# Patient Record
Sex: FEMALE | Race: WHITE | NOT HISPANIC OR LATINO | Employment: FULL TIME | URBAN - METROPOLITAN AREA
[De-identification: names, ages, dates, MRNs, and addresses within clinical notes are randomized per-mention and may not be internally consistent; named-entity substitution may affect disease eponyms.]

---

## 2019-09-06 ENCOUNTER — HOSPITAL ENCOUNTER (OUTPATIENT)
Dept: MAMMOGRAPHY | Facility: HOSPITAL | Age: 39
Discharge: HOME | End: 2019-09-06
Attending: NURSE PRACTITIONER

## 2021-08-03 ENCOUNTER — OFFICE VISIT (OUTPATIENT)
Dept: CARDIOLOGY | Facility: CLINIC | Age: 41
End: 2021-08-03

## 2021-08-03 VITALS
BODY MASS INDEX: 36.48 KG/M2 | WEIGHT: 227 LBS | HEART RATE: 72 BPM | SYSTOLIC BLOOD PRESSURE: 155 MMHG | DIASTOLIC BLOOD PRESSURE: 95 MMHG | HEIGHT: 66 IN

## 2021-08-03 DIAGNOSIS — I10 ESSENTIAL HYPERTENSION: ICD-10-CM

## 2021-08-03 DIAGNOSIS — I47.1 PAROXYSMAL SVT (SUPRAVENTRICULAR TACHYCARDIA) (HCC): Primary | ICD-10-CM

## 2021-08-03 PROBLEM — G43.909 MIGRAINES: Status: ACTIVE | Noted: 2021-08-03

## 2021-08-03 PROBLEM — I47.10 PAROXYSMAL SVT (SUPRAVENTRICULAR TACHYCARDIA): Status: ACTIVE | Noted: 2021-08-03

## 2021-08-03 PROCEDURE — 99204 OFFICE O/P NEW MOD 45 MIN: CPT | Performed by: INTERNAL MEDICINE

## 2021-08-03 PROCEDURE — 93000 ELECTROCARDIOGRAM COMPLETE: CPT | Performed by: INTERNAL MEDICINE

## 2021-08-03 RX ORDER — METOPROLOL SUCCINATE 50 MG/1
50 TABLET, EXTENDED RELEASE ORAL 2 TIMES DAILY
COMMUNITY
Start: 2021-07-19 | End: 2021-08-03

## 2021-08-03 RX ORDER — CARVEDILOL 6.25 MG/1
6.25 TABLET ORAL 2 TIMES DAILY
Qty: 180 TABLET | Refills: 3 | Status: SHIPPED | OUTPATIENT
Start: 2021-08-03 | End: 2022-08-24 | Stop reason: SDUPTHER

## 2021-08-03 RX ORDER — ERENUMAB-AOOE 70 MG/ML
INJECTION SUBCUTANEOUS
COMMUNITY
Start: 2021-08-01

## 2021-08-03 NOTE — PROGRESS NOTES
"Chief Complaint  Irregular Heart Beat (daily)      History of Present Illness  Beatriz Larios presents to [facility] CARDIOLOGY  Heart palp in April  And found to have elevated bp started in June on metoprolol   Dizziness while standing in store. Headaches   Patient is a 41-year-old female who developed heart palpitations visit in April.  She went to see her family doctor at that time was noted to have new hypertension as well.  Patient subsequently has been started on metoprolol and had some improvement in her blood pressure control but elevated today.  She also does report some dizziness at times with standing and headaches recently as well.  She has not had any overt syncope no chest pain shortness of breath PND orthopnea    Past Medical History:   Diagnosis Date   • Arrhythmia    • Essential hypertension 8/3/2021   • Hypertension    • Palpitations    • Paroxysmal SVT (supraventricular tachycardia) (CMS/[facility]) 8/3/2021         Current Outpatient Medications:   •  Aimovig 70 MG/ML prefilled syringe, Every 30 (Thirty) Days., Disp: , Rfl:   •  carvedilol (COREG) 6.25 MG tablet, Take 1 tablet by mouth 2 (Two) Times a Day., Disp: 180 tablet, Rfl: 3    Medications Discontinued During This Encounter   Medication Reason   • metoprolol succinate XL (TOPROL-XL) 50 MG 24 hr tablet      Allergies   Allergen Reactions   • Penicillins Hives        Social History     Tobacco Use   • Smoking status: Never Smoker   • Smokeless tobacco: Never Used   Vaping Use   • Vaping Use: Never used   Substance Use Topics   • Alcohol use: Yes     Alcohol/week: 7.0 standard drinks     Types: 7 Cans of beer per week   • Drug use: Never       Family History   Problem Relation Age of Onset   • Heart disease Father    • Coronary artery disease Paternal Grandfather     Dad with pacemaker      Objective     /95   Pulse 72   Ht 167.6 cm (66\")   Wt 103 kg (227 lb)   BMI 36.64 kg/m²       Physical Exam  Constitutional:       " General: He is awake. He is not in acute distress.     Appearance: Normal appearance.   Neck:      Vascular: No carotid bruit, hepatojugular reflux or JVD.   Cardiovascular:      Rate and Rhythm: Normal rate and regular rhythm.      Chest Wall: PMI is not displaced.      Heart sounds: Normal heart sounds, S1 normal and S2 normal. No murmur heard.   No friction rub. No gallop. No S3 or S4 sounds.    Pulmonary:      Effort: Pulmonary effort is normal.      Breath sounds: Normal breath sounds. No wheezing, rhonchi or rales.   Ext.      Right lower leg: No edema.      Left lower leg: No edema.   Skin:     General: Skin is warm and dry.      Coloration: Skin is not cyanotic.      Findings: No petechiae or rash.   Neurological:      Mental Status: He is alert.   Psychiatric:         Behavior: Behavior is cooperative.       Result Review :     No results found for: PROBNP       No results found for: TSH   No results found for: FREET4   No results found for: DDIMERQUANT  No results found for: MG   No results found for: DIGOXIN   No results found for: TROPONINT   No results found for: POCTROP(              ECG 12 Lead    Date/Time: 8/3/2021 2:32 PM  Performed by: Sudhir Johnson MD  Authorized by: Sudhir Johnson MD   Comparison: not compared with previous ECG   Rhythm: sinus rhythm              No results found for this or any previous visit.  Holter 5/21  Holter monitor with frequent PVCs occasional PACs  7 nonsustained SVT episodes            Diagnoses and all orders for this visit:    1. Paroxysmal SVT (supraventricular tachycardia) (CMS/HCC) (Primary)  Assessment & Plan:  Patient with moderate frequency PVCs and paroxysmal SVT seen on Holter monitor most likely the cause for her symptoms.  She has not had much improvement with her current beta-blocker recommended changing to Coreg 6.25 twice daily this also may better help treat her blood pressure.  Recommended a trial of magnesium supplementation 500 mg a day.  Will get  echocardiogram and recommend avoidance of caffeinated products    Orders:  -     Adult Transthoracic Echo Complete W/ Cont if Necessary Per Protocol; Future    2. Essential hypertension  Assessment & Plan:  Mild elevation in office today recommended getting a home blood pressure monitoring keeping log for review for further titration.  Counseled patient on  • low-sodium diet of less than 2 g  • Aerobic activity 30 minutes a day 5 times a week  • Weight loss          Other orders  -     carvedilol (COREG) 6.25 MG tablet; Take 1 tablet by mouth 2 (Two) Times a Day.  Dispense: 180 tablet; Refill: 3  -     Full Pulmonary Function Test Without Bronchodilator  -     ECG 12 Lead          Follow Up     Return in about 6 months (around 2/3/2022) for EKG with F/U.           Patient was given instructions and counseling regarding her condition or for health maintenance advice. Please see specific information pulled into the AVS if appropriate.

## 2021-08-03 NOTE — ASSESSMENT & PLAN NOTE
Patient with moderate frequency PVCs and paroxysmal SVT seen on Holter monitor most likely the cause for her symptoms.  She has not had much improvement with her current beta-blocker recommended changing to Coreg 6.25 twice daily this also may better help treat her blood pressure.  Recommended a trial of magnesium supplementation 500 mg a day.  Will get echocardiogram and recommend avoidance of caffeinated products

## 2021-08-03 NOTE — PATIENT INSTRUCTIONS
Supraventricular Tachycardia, Adult  Supraventricular tachycardia (SVT) is a kind of abnormal heartbeat. It makes your heart beat very fast and then beat at a normal speed.  A normal resting heartbeat is  times a minute. This condition can make your heart beat more than 150 times a minute. Times of having a fast heartbeat (episodes) can be scary, but they are usually not dangerous. In some cases, they may lead to heart failure if:  · They happen many times per day.  · Last longer than a few seconds.  What are the causes?    A normal heartbeat starts when an area called the sinoatrial node sends out an electrical signal. In SVT, other areas of the heart send out signals that get in the way of the signal from the sinoatrial node.  What increases the risk?  You are more likely to develop this condition if you are:  · 12-30 years old.  · A woman.  The following factors may make you more likely to develop this condition:  · Stress.  · Tiredness.  · Smoking.  · Stimulant drugs, such as cocaine and methamphetamine.  · Alcohol.  · Caffeine.  · Pregnancy.  · Feeling worried or nervous (anxiety).  What are the signs or symptoms?  · A pounding heart.  · A feeling that your heart is skipping beats (palpitations).  · Weakness.  · Trouble getting enough air.  · Pain or tightness in your chest.  · Feeling like you are going to pass out (faint).  · Feeling worried or nervous.  · Dizziness.  · Sweating.  · Feeling sick to your stomach (nausea).  · Passing out.  · Tiredness.  Sometimes, there are no symptoms.  How is this treated?  · Vagal nerve stimulation. Ways to do this include:  ? Holding your breath and pushing, as though you are pooping (having a bowel movement).  ? Massaging an area on one side of your neck. Do not try this yourself. Only a doctor should do this. If done the wrong way, it can lead to a stroke.  ? Bending forward with your head between your legs.  ? Coughing while bending forward with your head between  your legs.  ? Closing your eyes and massaging your eyeballs. Ask a doctor how to do this.  · Medicines that prevent attacks.  · Medicine to stop an attack given through an IV tube at the hospital.  · A small electric shock (cardioversion) that stops an attack.  · Radiofrequency ablation. In this procedure, a small, thin tube (catheter) is used to send energy to the area that is causing the rapid heartbeats.  If you do not have symptoms, you may not need treatment.  Follow these instructions at home:  Stress  · Avoid things that make you feel stressed.  · To deal with stress, try:  ? Doing yoga or meditation, or being out in nature.  ? Listening to relaxing music.  ? Doing deep breathing.  ? Taking steps to be healthy, such as getting lots of sleep, exercising, and eating a balanced diet.  ? Talking with a mental health doctor.  Lifestyle    · Try to get at least 7 hours of sleep each night.  · Do not use any products that contain nicotine or tobacco, such as cigarettes, e-cigarettes, and chewing tobacco. If you need help quitting, ask your doctor.  · Be aware of how alcohol affects you.  ? If alcohol gives you a fast heartbeat, do not drink alcohol.  ? If alcohol does not seem to give you a fast heartbeat, limit alcohol use to no more than 1 drink a day for women who are not pregnant, and 2 drinks a day for men. In the [place] one drink is one of these:  § 12 oz of beer (355 mL).  § 5 oz of wine (148 mL).  § 1½ oz of hard liquor (44 mL).  · Be aware of how caffeine affects you.  ? If caffeine gives you a fast heartbeat, do not eat, drink, or use anything with caffeine in it.  ? If caffeine does not seem to give you a fast heartbeat, limit how much caffeine you eat, drink, or use.  · Do not use stimulant drugs. If you need help quitting, ask your doctor.  General instructions  · Stay at a healthy weight.  · Exercise regularly. Ask your doctor about good activities for you. Try one or a mixture of these:  ? 150 minutes  a week of gentle exercise, like walking or yoga.  ? 75 minutes a week of exercise that is very active, like running or swimming.  · Do vagus nerve treatments to slow down your heartbeat as told by your doctor.  · Take over-the-counter and prescription medicines only as told by your doctor.  · Keep all follow-up visits as told by your doctor. This is important.  Contact a doctor if:  · You have a fast heartbeat more often.  · Times of having a fast heartbeat last longer than before.  · Home treatments to slow down your heartbeat do not help.  · You have new symptoms.  Get help right away if:  · You have chest pain.  · Your symptoms get worse.  · You have trouble breathing.  · Your heart beats very fast for more than 20 minutes.  · You pass out.  These symptoms may be an emergency. Do not wait to see if the symptoms will go away. Get medical help right away. Call your local emergency services (911 in the [place] Do not drive yourself to the hospital.  Summary  · SVT is a type of abnormal heart beat.  · This condition can make your heart beat more than 150 times a minute.  · Treatment depends on how often the condition happens and your symptoms.  This information is not intended to replace advice given to you by your health care provider. Make sure you discuss any questions you have with your health care provider.  Document Revised: 11/05/2019 Document Reviewed: 11/05/2019  [employer] Patient Education © 2021 [employer]      Hypertension, Adult  Hypertension is another name for high blood pressure. High blood pressure forces your heart to work harder to pump blood. This can cause problems over time.  There are two numbers in a blood pressure reading. There is a top number (systolic) over a bottom number (diastolic). It is best to have a blood pressure that is below 120/80. Healthy choices can help lower your blood pressure, or you may need medicine to help lower it.  What are the causes?  The cause of this condition is  not known. Some conditions may be related to high blood pressure.  What increases the risk?  · Smoking.  · Having type 2 diabetes mellitus, high cholesterol, or both.  · Not getting enough exercise or physical activity.  · Being overweight.  · Having too much fat, sugar, calories, or salt (sodium) in your diet.  · Drinking too much alcohol.  · Having long-term (chronic) kidney disease.  · Having a family history of high blood pressure.  · Age. Risk increases with age.  · Race. You may be at higher risk if you are .  · Gender. Men are at higher risk than women before age 45. After age 65, women are at higher risk than men.  · Having obstructive sleep apnea.  · Stress.  What are the signs or symptoms?  · High blood pressure may not cause symptoms. Very high blood pressure (hypertensive crisis) may cause:  ? Headache.  ? Feelings of worry or nervousness (anxiety).  ? Shortness of breath.  ? Nosebleed.  ? A feeling of being sick to your stomach (nausea).  ? Throwing up (vomiting).  ? Changes in how you see.  ? Very bad chest pain.  ? Seizures.  How is this treated?  · This condition is treated by making healthy lifestyle changes, such as:  ? Eating healthy foods.  ? Exercising more.  ? Drinking less alcohol.  · Your health care provider may prescribe medicine if lifestyle changes are not enough to get your blood pressure under control, and if:  ? Your top number is above 130.  ? Your bottom number is above 80.  · Your personal target blood pressure may vary.  Follow these instructions at home:  Eating and drinking    · If told, follow the DASH eating plan. To follow this plan:  ? Fill one half of your plate at each meal with fruits and vegetables.  ? Fill one fourth of your plate at each meal with whole grains. Whole grains include whole-wheat pasta, brown rice, and whole-grain bread.  ? Eat or drink low-fat dairy products, such as skim milk or low-fat yogurt.  ? Fill one fourth of your plate at each  meal with low-fat (lean) proteins. Low-fat proteins include fish, chicken without skin, eggs, beans, and tofu.  ? Avoid fatty meat, cured and processed meat, or chicken with skin.  ? Avoid pre-made or processed food.  · Eat less than 1,500 mg of salt each day.  · Do not drink alcohol if:  ? Your doctor tells you not to drink.  ? You are pregnant, may be pregnant, or are planning to become pregnant.  · If you drink alcohol:  ? Limit how much you use to:  § 0-1 drink a day for women.  § 0-2 drinks a day for men.  ? Be aware of how much alcohol is in your drink. In the [place] one drink equals one 12 oz bottle of beer (355 mL), one 5 oz glass of wine (148 mL), or one 1½ oz glass of hard liquor (44 mL).  Lifestyle    · Work with your doctor to stay at a healthy weight or to lose weight. Ask your doctor what the best weight is for you.  · Get at least 30 minutes of exercise most days of the week. This may include walking, swimming, or biking.  · Get at least 30 minutes of exercise that strengthens your muscles (resistance exercise) at least 3 days a week. This may include lifting weights or doing Pilates.  · Do not use any products that contain nicotine or tobacco, such as cigarettes, e-cigarettes, and chewing tobacco. If you need help quitting, ask your doctor.  · Check your blood pressure at home as told by your doctor.  · Keep all follow-up visits as told by your doctor. This is important.  Medicines  · Take over-the-counter and prescription medicines only as told by your doctor. Follow directions carefully.  · Do not skip doses of blood pressure medicine. The medicine does not work as well if you skip doses. Skipping doses also puts you at risk for problems.  · Ask your doctor about side effects or reactions to medicines that you should watch for.  Contact a doctor if you:  · Think you are having a reaction to the medicine you are taking.  · Have headaches that keep coming back (recurring).  · Feel dizzy.  · Have  swelling in your ankles.  · Have trouble with your vision.  Get help right away if you:  · Get a very bad headache.  · Start to feel mixed up (confused).  · Feel weak or numb.  · Feel faint.  · Have very bad pain in your:  ? Chest.  ? Belly (abdomen).  · Throw up more than once.  · Have trouble breathing.  Summary  · Hypertension is another name for high blood pressure.  · High blood pressure forces your heart to work harder to pump blood.  · For most people, a normal blood pressure is less than 120/80.  · Making healthy choices can help lower blood pressure. If your blood pressure does not get lower with healthy choices, you may need to take medicine.  This information is not intended to replace advice given to you by your health care provider. Make sure you discuss any questions you have with your health care provider.  Document Revised: 08/28/2019 Document Reviewed: 08/28/2019  [employer] Patient Education © 2021 [employer]

## 2021-08-03 NOTE — ASSESSMENT & PLAN NOTE
Mild elevation in office today recommended getting a home blood pressure monitoring keeping log for review for further titration.  Counseled patient on  • low-sodium diet of less than 2 g  • Aerobic activity 30 minutes a day 5 times a week  • Weight loss

## 2021-08-27 ENCOUNTER — TELEPHONE (OUTPATIENT)
Dept: CARDIOLOGY | Facility: CLINIC | Age: 41
End: 2021-08-27

## 2021-08-27 NOTE — TELEPHONE ENCOUNTER
----- Message from SKYE Reyes sent at 8/27/2021 11:00 AM EDT -----  Notify pt echo result:  Calculated left ventricular EF = 55%. Estimated right ventricular systolic pressure from tricuspid regurgitation is normal.  Trace mitral valve regurgitation is present.  Keep feb appt

## 2022-02-17 ENCOUNTER — OFFICE VISIT (OUTPATIENT)
Dept: CARDIOLOGY | Facility: CLINIC | Age: 42
End: 2022-02-17

## 2022-02-17 VITALS
BODY MASS INDEX: 34.84 KG/M2 | HEART RATE: 77 BPM | WEIGHT: 222 LBS | SYSTOLIC BLOOD PRESSURE: 152 MMHG | HEIGHT: 67 IN | DIASTOLIC BLOOD PRESSURE: 100 MMHG

## 2022-02-17 DIAGNOSIS — I47.1 PAROXYSMAL SVT (SUPRAVENTRICULAR TACHYCARDIA): ICD-10-CM

## 2022-02-17 DIAGNOSIS — I10 ESSENTIAL HYPERTENSION: Primary | ICD-10-CM

## 2022-02-17 PROCEDURE — 99214 OFFICE O/P EST MOD 30 MIN: CPT | Performed by: INTERNAL MEDICINE

## 2022-02-17 RX ORDER — OMEPRAZOLE 20 MG/1
20 CAPSULE, DELAYED RELEASE ORAL DAILY
COMMUNITY

## 2022-02-17 RX ORDER — HYDROCHLOROTHIAZIDE 12.5 MG/1
12.5 CAPSULE, GELATIN COATED ORAL DAILY
Qty: 90 CAPSULE | Refills: 3 | Status: SHIPPED | OUTPATIENT
Start: 2022-02-17 | End: 2022-08-30 | Stop reason: SDUPTHER

## 2022-02-17 RX ORDER — CETIRIZINE HYDROCHLORIDE 10 MG/1
10 TABLET ORAL DAILY
COMMUNITY

## 2022-02-17 NOTE — ASSESSMENT & PLAN NOTE
Blood pressure not ideally controlled recommend addition of hydrochlorothiazide 12.5 mg once a day and will repeat a BMP in 2 weeks  Counseled patient on  • low-sodium diet of less than 2 g  • Aerobic activity 30 minutes a day 5 times a week  • Weight loss

## 2022-02-17 NOTE — ASSESSMENT & PLAN NOTE
Symptomatically stable continue with Coreg 6.25 mg twice daily dosing encourage avoid caffeine products

## 2022-02-17 NOTE — PROGRESS NOTES
"Chief Complaint  Palpitations (same as usual)    Subjective    Patient has been having persistent mild heart palpitation issues but stable.  She is also noticed problems with persistent blood pressure elevations    Past Medical History:   Diagnosis Date   • Arrhythmia    • Essential hypertension 8/3/2021   • Hypertension    • Palpitations    • Paroxysmal SVT (supraventricular tachycardia) (HCC) 8/3/2021         Current Outpatient Medications:   •  Aimovig 70 MG/ML prefilled syringe, Every 30 (Thirty) Days., Disp: , Rfl:   •  carvedilol (COREG) 6.25 MG tablet, Take 1 tablet by mouth 2 (Two) Times a Day. (Patient taking differently: Take 6.25 mg by mouth Daily.), Disp: 180 tablet, Rfl: 3  •  cetirizine (zyrTEC) 10 MG tablet, Take 10 mg by mouth Daily., Disp: , Rfl:   •  omeprazole (priLOSEC) 20 MG capsule, Take 20 mg by mouth Daily., Disp: , Rfl:   •  hydroCHLOROthiazide (MICROZIDE) 12.5 MG capsule, Take 1 capsule by mouth Daily., Disp: 90 capsule, Rfl: 3    There are no discontinued medications.  Allergies   Allergen Reactions   • Penicillins Hives        Social History     Tobacco Use   • Smoking status: Never Smoker   • Smokeless tobacco: Never Used   Vaping Use   • Vaping Use: Never used   Substance Use Topics   • Alcohol use: Yes     Alcohol/week: 7.0 standard drinks     Types: 7 Cans of beer per week   • Drug use: Never       Family History   Problem Relation Age of Onset   • Heart disease Father    • Coronary artery disease Paternal Grandfather         Objective     /100   Pulse 77   Ht 170.2 cm (67\")   Wt 101 kg (222 lb)   BMI 34.77 kg/m²       Physical Exam    General Appearance:   · no acute distress  · Alert and oriented x3  HENT:   · lips not cyanotic  · Atraumatic  Neck:  · No jvd   · supple  Respiratory:  · no respiratory distress  · normal breath sounds  · no rales  Cardiovascular:  · Regular rate and rhythm  · no S3, no S4   · no murmur  · no rub  Extremities  · No cyanosis  · lower extremity " edema: none    Skin:   · warm, dry  · No rashes      Result Review :     No results found for: PROBNP       No results found for: TSH   No results found for: FREET4   No results found for: DDIMERQUANT  No results found for: MG   No results found for: DIGOXIN   No results found for: TROPONINT          No results found for: POCTROP    Results for orders placed in visit on 08/26/21    Adult Transthoracic Echo Complete W/ Cont if Necessary Per Protocol    Interpretation Summary  · Calculated left ventricular EF = 55% Calculated left ventricular 3D EF = 55% Estimated left ventricular EF was in agreement with the calculated left ventricular EF.  · Estimated right ventricular systolic pressure from tricuspid regurgitation is normal (<35 mmHg).                 Diagnoses and all orders for this visit:    1. Essential hypertension (Primary)  Assessment & Plan:  Blood pressure not ideally controlled recommend addition of hydrochlorothiazide 12.5 mg once a day and will repeat a BMP in 2 weeks  Counseled patient on  • low-sodium diet of less than 2 g  • Aerobic activity 30 minutes a day 5 times a week  • Weight loss        Orders:  -     Basic Metabolic Panel; Future    2. Paroxysmal SVT (supraventricular tachycardia) (HCC)  Assessment & Plan:  Symptomatically stable continue with Coreg 6.25 mg twice daily dosing encourage avoid caffeine products      Other orders  -     hydroCHLOROthiazide (MICROZIDE) 12.5 MG capsule; Take 1 capsule by mouth Daily.  Dispense: 90 capsule; Refill: 3          Follow Up     Return in about 6 months (around 8/17/2022).          Patient was given instructions and counseling regarding her condition or for health maintenance advice. Please see specific information pulled into the AVS if appropriate.

## 2022-03-03 ENCOUNTER — LAB (OUTPATIENT)
Dept: LAB | Facility: HOSPITAL | Age: 42
End: 2022-03-03

## 2022-03-03 DIAGNOSIS — I10 ESSENTIAL HYPERTENSION: ICD-10-CM

## 2022-03-03 LAB
ANION GAP SERPL CALCULATED.3IONS-SCNC: 12 MMOL/L (ref 5–15)
BUN SERPL-MCNC: 13 MG/DL (ref 6–20)
BUN/CREAT SERPL: 22 (ref 7–25)
CALCIUM SPEC-SCNC: 9.5 MG/DL (ref 8.6–10.5)
CHLORIDE SERPL-SCNC: 102 MMOL/L (ref 98–107)
CO2 SERPL-SCNC: 24 MMOL/L (ref 22–29)
CREAT SERPL-MCNC: 0.59 MG/DL (ref 0.57–1)
EGFRCR SERPLBLD CKD-EPI 2021: 114.1 ML/MIN/1.73
GLUCOSE SERPL-MCNC: 101 MG/DL (ref 65–99)
POTASSIUM SERPL-SCNC: 4 MMOL/L (ref 3.5–5.2)
SODIUM SERPL-SCNC: 138 MMOL/L (ref 136–145)

## 2022-03-03 PROCEDURE — 36415 COLL VENOUS BLD VENIPUNCTURE: CPT

## 2022-03-03 PROCEDURE — 80048 BASIC METABOLIC PNL TOTAL CA: CPT

## 2022-03-31 ENCOUNTER — TELEPHONE (OUTPATIENT)
Dept: OBSTETRICS AND GYNECOLOGY | Facility: CLINIC | Age: 42
End: 2022-03-31

## 2022-03-31 NOTE — TELEPHONE ENCOUNTER
1st attempt- received referral from SKYE Haque at [facility]. Patient is being referred for her routine yearly exam. Unable to leave voicemail due to it not being set up. Please schedule first available.

## 2022-04-12 ENCOUNTER — OFFICE VISIT (OUTPATIENT)
Dept: OBSTETRICS AND GYNECOLOGY | Facility: CLINIC | Age: 42
End: 2022-04-12

## 2022-04-12 VITALS
HEART RATE: 103 BPM | DIASTOLIC BLOOD PRESSURE: 101 MMHG | SYSTOLIC BLOOD PRESSURE: 143 MMHG | HEIGHT: 67 IN | WEIGHT: 227 LBS | BODY MASS INDEX: 35.63 KG/M2

## 2022-04-12 DIAGNOSIS — Z01.419 WELL WOMAN EXAM: Primary | ICD-10-CM

## 2022-04-12 DIAGNOSIS — Z12.31 ENCOUNTER FOR SCREENING MAMMOGRAM FOR MALIGNANT NEOPLASM OF BREAST: ICD-10-CM

## 2022-04-12 PROCEDURE — 87624 HPV HI-RISK TYP POOLED RSLT: CPT | Performed by: NURSE PRACTITIONER

## 2022-04-12 PROCEDURE — G0123 SCREEN CERV/VAG THIN LAYER: HCPCS | Performed by: NURSE PRACTITIONER

## 2022-04-12 PROCEDURE — 99396 PREV VISIT EST AGE 40-64: CPT | Performed by: NURSE PRACTITIONER

## 2022-04-12 RX ORDER — VALACYCLOVIR HYDROCHLORIDE 1 G/1
TABLET, FILM COATED ORAL AS NEEDED
COMMUNITY
Start: 2022-03-22

## 2022-04-12 NOTE — PROGRESS NOTES
"  HPI:   42 y.o..Presents for well woman exam. Contraception or HRT: Contraception:  Vasectomy   Menses:   No vaginal bleeding s/p endometrial ablation   Pain:  None  Last pap normal and last    Complaints: Pt has no complaints today.  Patient reports that she is not currently experiencing any symptoms of urinary incontinence.    Past Medical History:   Diagnosis Date   • Arrhythmia    • Essential hypertension 8/3/2021   • Hypertension    • Palpitations    • Paroxysmal SVT (supraventricular tachycardia) (HCC) 8/3/2021      Past Surgical History:   Procedure Laterality Date   • APPENDECTOMY     • ENDOMETRIAL ABLATION        Family History   Problem Relation Age of Onset   • Melanoma Father    • Heart disease Father 50   • Coronary artery disease Paternal Grandfather         PCP: does manage PMHx and preventative labs    PHYSICAL EXAM: Chaperone present BP (!) 143/101   Pulse 103   Ht 170.2 cm (67\")   Wt 103 kg (227 lb)   BMI 35.55 kg/m²   General- NAD, alert and oriented, appropriate  Psych- Normal mood, good memory  Neck- No masses, no thyroid enlargement  Lymphatic- No palpable neck, axillary, or groin nodes  CV- Regular rhythm, no murmurs  Resp- CTA to bases, no wheezes  Abdomen- Soft, non distended, non tender, no masses  Breast left-  Bilaterally symmetrical, no masses, non tender, no nipple discharge  Breast right- Bilaterally symmetrical, no masses, non tender, no nipple discharge  External genitalia- Normal female, no lesions  Urethra/meatus- Normal, no masses, non tender, no prolapse  Bladder- Normal, no masses, non tender, no prolapse  Vagina- Normal, no atrophy, no lesions, no discharge, no prolapse  Cvx- Normal, no lesions, no discharge, No cervical motion tenderness  Uterus- Normal size, shape & consistency.  Non tender, mobile, & no prolapse  Adnexa- No mass, non tender  Anus/Rectum/Perineum- Not performed  Ext- No edema, no cyanosis    Skin- No lesions, no rashes, no acanthosis " nigricans       ASSESSMENT and PLAN:    Diagnoses and all orders for this visit:    1. Well woman exam (Primary)  -     IGP,rfx Aptima HPV All Pth    2. Encounter for screening mammogram for malignant neoplasm of breast  -     Mammo Screening Digital Tomosynthesis Bilateral With CAD; Future        Preventative:   BREAST HEALTH- Monthly self breast exam importance and how to reviewed. MMG and/or MRI (prn) reviewed per society guidelines and her individual history. Screen: Updated today  CERVICAL CANCER Screening- Reviewed current ASCCP guidelines for screening w and wo cotest HPV, age specific.  Screen: Updated today  Follow up PCP/Specialist PMHx and Labs  Myriad: Does not qualify.    She understands the importance of having any ordered tests to be performed in a timely fashion.  The risks of not performing them include, but are not limited to, advanced cancer stages, bone loss from osteoporosis and/or subsequent increase in morbidity and/or mortality.  She is encouraged to review her results online and/or contact or office if she has questions.     Follow Up:  Return for as needed.        Anya Ramirez, APRN  04/12/2022

## 2022-04-21 LAB
CONV .: ABNORMAL
CYTOLOGIST CVX/VAG CYTO: ABNORMAL
CYTOLOGY CVX/VAG DOC CYTO: ABNORMAL
CYTOLOGY CVX/VAG DOC THIN PREP: ABNORMAL
DX ICD CODE: ABNORMAL
DX ICD CODE: ABNORMAL
HIV 1 & 2 AB SER-IMP: ABNORMAL
HPV I/H RISK 4 DNA CVX QL PROBE+SIG AMP: NEGATIVE
OTHER STN SPEC: ABNORMAL
PATHOLOGIST CVX/VAG CYTO: ABNORMAL
RECOM F/U CVX/VAG CYTO: ABNORMAL
STAT OF ADQ CVX/VAG CYTO-IMP: ABNORMAL

## 2022-05-12 ENCOUNTER — HOSPITAL ENCOUNTER (OUTPATIENT)
Dept: MAMMOGRAPHY | Facility: HOSPITAL | Age: 42
Discharge: HOME | End: 2022-05-12
Admitting: NURSE PRACTITIONER

## 2022-05-12 DIAGNOSIS — Z12.31 ENCOUNTER FOR SCREENING MAMMOGRAM FOR MALIGNANT NEOPLASM OF BREAST: ICD-10-CM

## 2022-05-12 PROCEDURE — 77067 SCR MAMMO BI INCL CAD: CPT

## 2022-05-12 PROCEDURE — 77063 BREAST TOMOSYNTHESIS BI: CPT

## 2022-08-24 ENCOUNTER — OFFICE VISIT (OUTPATIENT)
Dept: CARDIOLOGY | Facility: CLINIC | Age: 42
End: 2022-08-24

## 2022-08-24 VITALS
SYSTOLIC BLOOD PRESSURE: 136 MMHG | HEART RATE: 72 BPM | HEIGHT: 67 IN | DIASTOLIC BLOOD PRESSURE: 67 MMHG | BODY MASS INDEX: 35.22 KG/M2 | WEIGHT: 224.4 LBS

## 2022-08-24 DIAGNOSIS — I10 ESSENTIAL HYPERTENSION: Primary | ICD-10-CM

## 2022-08-24 DIAGNOSIS — I47.1 PAROXYSMAL SVT (SUPRAVENTRICULAR TACHYCARDIA): ICD-10-CM

## 2022-08-24 PROCEDURE — 99214 OFFICE O/P EST MOD 30 MIN: CPT | Performed by: INTERNAL MEDICINE

## 2022-08-24 RX ORDER — CARVEDILOL 6.25 MG/1
6.25 TABLET ORAL EVERY 24 HOURS
Qty: 90 TABLET | Refills: 3 | Status: SHIPPED | OUTPATIENT
Start: 2022-08-24

## 2022-08-24 NOTE — PROGRESS NOTES
"Chief Complaint  Hypertension and Paroxysmal SVT (supraventricular tachycardia)     Subjective    Patient is doing very well no symptomatic tachycardic problems blood pressure be maintained    Past Medical History:   Diagnosis Date   • Arrhythmia    • Essential hypertension 8/3/2021   • Hypertension    • Palpitations    • Paroxysmal SVT (supraventricular tachycardia) (HCC) 8/3/2021         Current Outpatient Medications:   •  Aimovig 70 MG/ML prefilled syringe, Every 30 (Thirty) Days., Disp: , Rfl:   •  carvedilol (COREG) 6.25 MG tablet, Take 1 tablet by mouth Daily., Disp: 90 tablet, Rfl: 3  •  cetirizine (zyrTEC) 10 MG tablet, Take 10 mg by mouth Daily., Disp: , Rfl:   •  hydroCHLOROthiazide (MICROZIDE) 12.5 MG capsule, Take 1 capsule by mouth Daily., Disp: 90 capsule, Rfl: 3  •  omeprazole (priLOSEC) 20 MG capsule, Take 20 mg by mouth Daily., Disp: , Rfl:   •  valACYclovir (VALTREX) 1000 MG tablet, As Needed., Disp: , Rfl:     Medications Discontinued During This Encounter   Medication Reason   • carvedilol (COREG) 6.25 MG tablet Reorder     Allergies   Allergen Reactions   • Penicillins Hives        Social History     Tobacco Use   • Smoking status: Never Smoker   • Smokeless tobacco: Never Used   Vaping Use   • Vaping Use: Never used   Substance Use Topics   • Alcohol use: Not Currently     Alcohol/week: 7.0 standard drinks     Types: 7 Cans of beer per week   • Drug use: Never       Family History   Problem Relation Age of Onset   • Melanoma Father    • Heart disease Father 50   • Coronary artery disease Paternal Grandfather         Objective     /67   Pulse 72   Ht 170.2 cm (67\")   Wt 102 kg (224 lb 6.4 oz)   BMI 35.15 kg/m²       Physical Exam    General Appearance:   · no acute distress  · Alert and oriented x3  HENT:   · lips not cyanotic  · Atraumatic  Neck:  · No jvd   · supple  Respiratory:  · no respiratory distress  · normal breath sounds  · no rales  Cardiovascular:  · Regular rate and " rhythm  · no S3, no S4   · no murmur  · no rub  Extremities  · No cyanosis  · lower extremity edema: none    Skin:   · warm, dry  · No rashes      Result Review :     No results found for: PROBNP  CMP    CMP 3/3/22   Glucose 101 (A)   BUN 13   Creatinine 0.59   Sodium 138   Potassium 4.0   Chloride 102   Calcium 9.5   (A) Abnormal value               No results found for: TSH   Lab Results   Component Value Date    FREET4 0.81 03/22/2022      No results found for: DDIMERQUANT  No results found for: MG   No results found for: DIGOXIN   No results found for: TROPONINT          No results found for: POCTROP    Results for orders placed in visit on 08/26/21    Adult Transthoracic Echo Complete W/ Cont if Necessary Per Protocol    Interpretation Summary  · Calculated left ventricular EF = 55% Calculated left ventricular 3D EF = 55% Estimated left ventricular EF was in agreement with the calculated left ventricular EF.  · Estimated right ventricular systolic pressure from tricuspid regurgitation is normal (<35 mmHg).                           Diagnoses and all orders for this visit:    1. Essential hypertension (Primary)  Assessment & Plan:  Continue with HCTZ 12.5 daily and Coreg 6.25 daily  Counseled patient on  • low-sodium diet of less than 2 g  • Aerobic activity 30 minutes a day 5 times a week  • Weight loss          2. Paroxysmal SVT (supraventricular tachycardia) (HCC)  Assessment & Plan:  Patient symptomatically stable continue Coreg 6.25 daily      Other orders  -     carvedilol (COREG) 6.25 MG tablet; Take 1 tablet by mouth Daily.  Dispense: 90 tablet; Refill: 3          Follow Up     Return in about 1 year (around 8/24/2023) for EKG with F/U.          Patient was given instructions and counseling regarding her condition or for health maintenance advice. Please see specific information pulled into the AVS if appropriate.

## 2022-08-30 RX ORDER — HYDROCHLOROTHIAZIDE 12.5 MG/1
12.5 CAPSULE, GELATIN COATED ORAL DAILY
Qty: 90 CAPSULE | Refills: 3 | Status: SHIPPED | OUTPATIENT
Start: 2022-08-30

## 2023-04-06 ENCOUNTER — TELEPHONE (OUTPATIENT)
Dept: CARDIOLOGY | Facility: CLINIC | Age: 43
End: 2023-04-06
Payer: COMMERCIAL

## 2023-04-06 NOTE — TELEPHONE ENCOUNTER
Caller: Harriet Beatriz     Relationship: SELF     Best call back number: [phone]    What is your medical concern? BP HAS BEEN GREAT BUT FOR THE LAST MONTH PT HAS BEEN FEELING DIZZY AND LIGHT HEADED. PASSED OUT ONCE. PT WOULD LIKE TO KNOW IF SHE CAN STOP TAKING BP MEDS.     How long has this issue been going on? PAST MONTH    Is your provider already aware of this issue?NO     Have you been treated for this issue? NO

## 2023-04-12 ENCOUNTER — TRANSCRIBE ORDERS (OUTPATIENT)
Dept: ADMINISTRATIVE | Facility: HOSPITAL | Age: 43
End: 2023-04-12
Payer: COMMERCIAL

## 2023-04-12 DIAGNOSIS — Z12.31 ENCOUNTER FOR SCREENING MAMMOGRAM FOR MALIGNANT NEOPLASM OF BREAST: Primary | ICD-10-CM

## 2023-04-12 NOTE — TELEPHONE ENCOUNTER
SW patient. Patient states since having 70 ln weight loss she started having dizziness and lightheaded. Patient quit taking her HCTZ and coreg. Patient states since quitting she has felt much better and BP running 105-110/70. Advised I would make note and to keep monitoring BP.

## 2023-08-23 ENCOUNTER — OFFICE VISIT (OUTPATIENT)
Dept: CARDIOLOGY | Facility: CLINIC | Age: 43
End: 2023-08-23
Payer: COMMERCIAL

## 2023-08-23 VITALS
BODY MASS INDEX: 22.13 KG/M2 | SYSTOLIC BLOOD PRESSURE: 126 MMHG | HEIGHT: 67 IN | DIASTOLIC BLOOD PRESSURE: 83 MMHG | HEART RATE: 77 BPM | WEIGHT: 141 LBS

## 2023-08-23 DIAGNOSIS — I10 ESSENTIAL HYPERTENSION: ICD-10-CM

## 2023-08-23 DIAGNOSIS — I47.1 PAROXYSMAL SVT (SUPRAVENTRICULAR TACHYCARDIA): Primary | ICD-10-CM

## 2023-08-23 PROCEDURE — 93000 ELECTROCARDIOGRAM COMPLETE: CPT | Performed by: INTERNAL MEDICINE

## 2023-08-23 PROCEDURE — 99213 OFFICE O/P EST LOW 20 MIN: CPT | Performed by: INTERNAL MEDICINE

## 2023-08-23 RX ORDER — ATORVASTATIN CALCIUM 10 MG/1
10 TABLET, FILM COATED ORAL
COMMUNITY

## 2023-08-23 NOTE — PROGRESS NOTES
"Chief Complaint  Follow-up, Hypertension, and Rapid Heart Rate    Subjective    Patient is doing well symptomatically she has had a 83 pound weight loss since her last visit here still some occasional heart palpitations but no significant tachycardic issues.  Past Medical History:   Diagnosis Date    Arrhythmia     Essential hypertension 8/3/2021    Hypertension     Palpitations     Paroxysmal SVT (supraventricular tachycardia) 8/3/2021         Current Outpatient Medications:     Aimovig 70 MG/ML prefilled syringe, Every 30 (Thirty) Days., Disp: , Rfl:     atorvastatin (LIPITOR) 10 MG tablet, Take 1 tablet by mouth every night at bedtime., Disp: , Rfl:     cetirizine (zyrTEC) 10 MG tablet, Take 1 tablet by mouth Daily., Disp: , Rfl:     valACYclovir (VALTREX) 1000 MG tablet, As Needed., Disp: , Rfl:     Medications Discontinued During This Encounter   Medication Reason    carvedilol (COREG) 6.25 MG tablet *Therapy completed    hydroCHLOROthiazide (MICROZIDE) 12.5 MG capsule *Therapy completed    omeprazole (priLOSEC) 20 MG capsule *Therapy completed     Allergies   Allergen Reactions    Penicillins Hives        Social History     Tobacco Use    Smoking status: Never    Smokeless tobacco: Never   Vaping Use    Vaping Use: Never used   Substance Use Topics    Alcohol use: Not Currently     Alcohol/week: 7.0 standard drinks     Types: 7 Cans of beer per week    Drug use: Never       Family History   Problem Relation Age of Onset    Melanoma Father     Heart disease Father 50    Coronary artery disease Paternal Grandfather         Objective     /83   Pulse 77   Ht 170.2 cm (67\")   Wt 64 kg (141 lb)   BMI 22.08 kg/mý       Physical Exam    General Appearance:   no acute distress  Alert and oriented x3  HENT:   lips not cyanotic  Atraumatic  Neck:  No jvd   supple  Respiratory:  no respiratory distress  normal breath sounds  no rales  Cardiovascular:  Regular rate and rhythm  no S3, no S4   no murmur  no " rub  Extremities  No cyanosis  lower extremity edema: none    Skin:   warm, dry  No rashes      Result Review :     No results found for: PROBNP       No results found for: TSH   Lab Results   Component Value Date    FREET4 0.81 03/22/2022      No results found for: DDIMERQUANT  No results found for: MG   No results found for: DIGOXIN   No results found for: TROPONINT          No results found for: POCTROP    Results for orders placed in visit on 08/26/21    Adult Transthoracic Echo Complete W/ Cont if Necessary Per Protocol    Interpretation Summary  ú Calculated left ventricular EF = 55% Calculated left ventricular 3D EF = 55% Estimated left ventricular EF was in agreement with the calculated left ventricular EF.  ú Estimated right ventricular systolic pressure from tricuspid regurgitation is normal (<35 mmHg).       ECG 12 Lead    Date/Time: 8/23/2023 3:41 PM  Performed by: Sudhir Johnson MD  Authorized by: Sudhir Johnson MD   Comparison: compared with previous ECG   Similar to previous ECG  Rhythm: sinus rhythm               Diagnoses and all orders for this visit:    1. Paroxysmal SVT (supraventricular tachycardia) (HCC) (Primary)  Assessment & Plan:  Patient is symptomatically stable she can just follow-up on as-needed basis    Orders:  -     ECG 12 Lead    2. Essential hypertension  Assessment & Plan:  Blood pressure well controlled currently currently no a antihypertensive needed              Follow Up     No follow-ups on file.          Patient was given instructions and counseling regarding her condition or for health maintenance advice. Please see specific information pulled into the AVS if appropriate.

## 2024-01-23 ENCOUNTER — APPOINTMENT (OUTPATIENT)
Dept: CT IMAGING | Facility: HOSPITAL | Age: 44
End: 2024-01-23
Payer: COMMERCIAL

## 2024-01-23 ENCOUNTER — HOSPITAL ENCOUNTER (EMERGENCY)
Facility: HOSPITAL | Age: 44
Discharge: HOME | End: 2024-01-23
Attending: EMERGENCY MEDICINE | Admitting: EMERGENCY MEDICINE
Payer: COMMERCIAL

## 2024-01-23 ENCOUNTER — APPOINTMENT (OUTPATIENT)
Dept: GENERAL RADIOLOGY | Facility: HOSPITAL | Age: 44
End: 2024-01-23
Payer: COMMERCIAL

## 2024-01-23 VITALS
WEIGHT: 140.43 LBS | RESPIRATION RATE: 16 BRPM | BODY MASS INDEX: 22.57 KG/M2 | OXYGEN SATURATION: 99 % | SYSTOLIC BLOOD PRESSURE: 104 MMHG | DIASTOLIC BLOOD PRESSURE: 74 MMHG | TEMPERATURE: 98.2 F | HEART RATE: 81 BPM | HEIGHT: 66 IN

## 2024-01-23 DIAGNOSIS — R56.9 SEIZURE: Primary | ICD-10-CM

## 2024-01-23 LAB
ALBUMIN SERPL-MCNC: 4.5 G/DL (ref 3.5–5.2)
ALBUMIN/GLOB SERPL: 1.6 G/DL
ALP SERPL-CCNC: 51 U/L (ref 39–117)
ALT SERPL W P-5'-P-CCNC: 20 U/L (ref 1–33)
ANION GAP SERPL CALCULATED.3IONS-SCNC: 10.8 MMOL/L (ref 5–15)
AST SERPL-CCNC: 15 U/L (ref 1–32)
BASOPHILS # BLD AUTO: 0.06 10*3/MM3 (ref 0–0.2)
BASOPHILS NFR BLD AUTO: 0.8 % (ref 0–1.5)
BILIRUB SERPL-MCNC: 0.4 MG/DL (ref 0–1.2)
BILIRUB UR QL STRIP: NEGATIVE
BUN SERPL-MCNC: 18 MG/DL (ref 6–20)
BUN/CREAT SERPL: 22.5 (ref 7–25)
CALCIUM SPEC-SCNC: 9.6 MG/DL (ref 8.6–10.5)
CHLORIDE SERPL-SCNC: 106 MMOL/L (ref 98–107)
CK SERPL-CCNC: 64 U/L (ref 20–180)
CLARITY UR: ABNORMAL
CO2 SERPL-SCNC: 23.2 MMOL/L (ref 22–29)
COLOR UR: YELLOW
CREAT SERPL-MCNC: 0.8 MG/DL (ref 0.57–1)
DEPRECATED RDW RBC AUTO: 43.8 FL (ref 37–54)
EGFRCR SERPLBLD CKD-EPI 2021: 92.7 ML/MIN/1.73
EOSINOPHIL # BLD AUTO: 0.08 10*3/MM3 (ref 0–0.4)
EOSINOPHIL NFR BLD AUTO: 1.1 % (ref 0.3–6.2)
ERYTHROCYTE [DISTWIDTH] IN BLOOD BY AUTOMATED COUNT: 12.6 % (ref 12.3–15.4)
GLOBULIN UR ELPH-MCNC: 2.8 GM/DL
GLUCOSE SERPL-MCNC: 89 MG/DL (ref 65–99)
GLUCOSE UR STRIP-MCNC: NEGATIVE MG/DL
HCT VFR BLD AUTO: 40.7 % (ref 34–46.6)
HGB BLD-MCNC: 13.5 G/DL (ref 12–15.9)
HGB UR QL STRIP.AUTO: NEGATIVE
HOLD SPECIMEN: NORMAL
HOLD SPECIMEN: NORMAL
IMM GRANULOCYTES # BLD AUTO: 0.01 10*3/MM3 (ref 0–0.05)
IMM GRANULOCYTES NFR BLD AUTO: 0.1 % (ref 0–0.5)
KETONES UR QL STRIP: ABNORMAL
LEUKOCYTE ESTERASE UR QL STRIP.AUTO: NEGATIVE
LYMPHOCYTES # BLD AUTO: 3.32 10*3/MM3 (ref 0.7–3.1)
LYMPHOCYTES NFR BLD AUTO: 46.8 % (ref 19.6–45.3)
MAGNESIUM SERPL-MCNC: 2.2 MG/DL (ref 1.6–2.6)
MCH RBC QN AUTO: 31.1 PG (ref 26.6–33)
MCHC RBC AUTO-ENTMCNC: 33.2 G/DL (ref 31.5–35.7)
MCV RBC AUTO: 93.8 FL (ref 79–97)
MONOCYTES # BLD AUTO: 0.45 10*3/MM3 (ref 0.1–0.9)
MONOCYTES NFR BLD AUTO: 6.3 % (ref 5–12)
NEUTROPHILS NFR BLD AUTO: 3.17 10*3/MM3 (ref 1.7–7)
NEUTROPHILS NFR BLD AUTO: 44.9 % (ref 42.7–76)
NITRITE UR QL STRIP: NEGATIVE
NRBC BLD AUTO-RTO: 0 /100 WBC (ref 0–0.2)
PH UR STRIP.AUTO: 5.5 [PH] (ref 5–8)
PLATELET # BLD AUTO: 282 10*3/MM3 (ref 140–450)
PMV BLD AUTO: 10.3 FL (ref 6–12)
POTASSIUM SERPL-SCNC: 4.2 MMOL/L (ref 3.5–5.2)
PROT SERPL-MCNC: 7.3 G/DL (ref 6–8.5)
PROT UR QL STRIP: NEGATIVE
QT INTERVAL: 399 MS
QTC INTERVAL: 414 MS
RBC # BLD AUTO: 4.34 10*6/MM3 (ref 3.77–5.28)
SODIUM SERPL-SCNC: 140 MMOL/L (ref 136–145)
SP GR UR STRIP: 1.02 (ref 1–1.03)
TROPONIN T SERPL HS-MCNC: <6 NG/L
UROBILINOGEN UR QL STRIP: ABNORMAL
WBC NRBC COR # BLD AUTO: 7.09 10*3/MM3 (ref 3.4–10.8)
WHOLE BLOOD HOLD COAG: NORMAL
WHOLE BLOOD HOLD SPECIMEN: NORMAL

## 2024-01-23 PROCEDURE — 80053 COMPREHEN METABOLIC PANEL: CPT | Performed by: EMERGENCY MEDICINE

## 2024-01-23 PROCEDURE — 25010000002 DIPHENHYDRAMINE PER 50 MG: Performed by: EMERGENCY MEDICINE

## 2024-01-23 PROCEDURE — 25010000002 LEVETIRACETAM IN NACL 0.75% 1000 MG/100ML SOLUTION: Performed by: EMERGENCY MEDICINE

## 2024-01-23 PROCEDURE — 96374 THER/PROPH/DIAG INJ IV PUSH: CPT

## 2024-01-23 PROCEDURE — 99284 EMERGENCY DEPT VISIT MOD MDM: CPT

## 2024-01-23 PROCEDURE — 93005 ELECTROCARDIOGRAM TRACING: CPT | Performed by: EMERGENCY MEDICINE

## 2024-01-23 PROCEDURE — 25010000002 METOCLOPRAMIDE PER 10 MG: Performed by: EMERGENCY MEDICINE

## 2024-01-23 PROCEDURE — 71045 X-RAY EXAM CHEST 1 VIEW: CPT

## 2024-01-23 PROCEDURE — 70450 CT HEAD/BRAIN W/O DYE: CPT

## 2024-01-23 PROCEDURE — 81003 URINALYSIS AUTO W/O SCOPE: CPT | Performed by: EMERGENCY MEDICINE

## 2024-01-23 PROCEDURE — 82550 ASSAY OF CK (CPK): CPT | Performed by: EMERGENCY MEDICINE

## 2024-01-23 PROCEDURE — 84484 ASSAY OF TROPONIN QUANT: CPT | Performed by: EMERGENCY MEDICINE

## 2024-01-23 PROCEDURE — 83735 ASSAY OF MAGNESIUM: CPT | Performed by: EMERGENCY MEDICINE

## 2024-01-23 PROCEDURE — 25010000002 KETOROLAC TROMETHAMINE PER 15 MG: Performed by: EMERGENCY MEDICINE

## 2024-01-23 PROCEDURE — 85025 COMPLETE CBC W/AUTO DIFF WBC: CPT | Performed by: EMERGENCY MEDICINE

## 2024-01-23 PROCEDURE — 25810000003 SODIUM CHLORIDE 0.9 % SOLUTION: Performed by: EMERGENCY MEDICINE

## 2024-01-23 PROCEDURE — 93005 ELECTROCARDIOGRAM TRACING: CPT

## 2024-01-23 PROCEDURE — 96375 TX/PRO/DX INJ NEW DRUG ADDON: CPT

## 2024-01-23 RX ORDER — LEVETIRACETAM 10 MG/ML
1000 INJECTION INTRAVASCULAR EVERY 12 HOURS SCHEDULED
Status: DISCONTINUED | OUTPATIENT
Start: 2024-01-23 | End: 2024-01-23 | Stop reason: HOSPADM

## 2024-01-23 RX ORDER — METOCLOPRAMIDE HYDROCHLORIDE 5 MG/ML
10 INJECTION INTRAMUSCULAR; INTRAVENOUS ONCE
Status: COMPLETED | OUTPATIENT
Start: 2024-01-23 | End: 2024-01-23

## 2024-01-23 RX ORDER — KETOROLAC TROMETHAMINE 10 MG/1
10 TABLET, FILM COATED ORAL EVERY 6 HOURS PRN
Qty: 15 TABLET | Refills: 0 | Status: SHIPPED | OUTPATIENT
Start: 2024-01-23

## 2024-01-23 RX ORDER — BUPROPION HYDROCHLORIDE 150 MG/1
150 TABLET, EXTENDED RELEASE ORAL
COMMUNITY

## 2024-01-23 RX ORDER — ACETAMINOPHEN 500 MG
1000 TABLET ORAL ONCE
Status: COMPLETED | OUTPATIENT
Start: 2024-01-23 | End: 2024-01-23

## 2024-01-23 RX ORDER — LEVETIRACETAM 500 MG/1
500 TABLET ORAL 2 TIMES DAILY
Qty: 30 TABLET | Refills: 0 | Status: SHIPPED | OUTPATIENT
Start: 2024-01-23

## 2024-01-23 RX ORDER — SODIUM CHLORIDE 0.9 % (FLUSH) 0.9 %
10 SYRINGE (ML) INJECTION AS NEEDED
Status: DISCONTINUED | OUTPATIENT
Start: 2024-01-23 | End: 2024-01-23 | Stop reason: HOSPADM

## 2024-01-23 RX ORDER — DIPHENHYDRAMINE HYDROCHLORIDE 50 MG/ML
12.5 INJECTION INTRAMUSCULAR; INTRAVENOUS ONCE
Status: COMPLETED | OUTPATIENT
Start: 2024-01-23 | End: 2024-01-23

## 2024-01-23 RX ORDER — KETOROLAC TROMETHAMINE 30 MG/ML
30 INJECTION, SOLUTION INTRAMUSCULAR; INTRAVENOUS ONCE
Status: COMPLETED | OUTPATIENT
Start: 2024-01-23 | End: 2024-01-23

## 2024-01-23 RX ADMIN — ACETAMINOPHEN 1000 MG: 500 TABLET ORAL at 16:50

## 2024-01-23 RX ADMIN — SODIUM CHLORIDE 1000 ML: 9 INJECTION, SOLUTION INTRAVENOUS at 16:39

## 2024-01-23 RX ADMIN — METOCLOPRAMIDE HYDROCHLORIDE 10 MG: 5 INJECTION INTRAMUSCULAR; INTRAVENOUS at 16:44

## 2024-01-23 RX ADMIN — DIPHENHYDRAMINE HYDROCHLORIDE 12.5 MG: 50 INJECTION, SOLUTION INTRAMUSCULAR; INTRAVENOUS at 16:42

## 2024-01-23 RX ADMIN — LEVETIRACETAM 1000 MG: 1000 INJECTION, SOLUTION INTRAVENOUS at 15:06

## 2024-01-23 RX ADMIN — KETOROLAC TROMETHAMINE 30 MG: 30 INJECTION, SOLUTION INTRAMUSCULAR; INTRAVENOUS at 16:48

## 2024-01-23 RX ADMIN — SODIUM CHLORIDE 1000 ML: 9 INJECTION, SOLUTION INTRAVENOUS at 15:04

## 2024-01-23 NOTE — ED PROVIDER NOTES
Time: 2:18 PM EST  Date of encounter:  1/23/2024  Independent Historian/Clinical History and Information was obtained by:   Patient and Family    History is limited by: N/A    Chief Complaint: Seizure      History of Present Illness:  Patient is a 43 y.o. year old female who presents to the emergency department for evaluation of seizure-like activity earlier today.  Patient currently reports a headache.  Patient denies numbness and tingling.  Patient has no chest pain or shortness of breath.  Patient denies cough and hemoptysis.  Patient denies dysuria and urinary frequency.  Patient denies fever and chills.    HPI    Patient Care Team  Primary Care Provider: Pooja Haque APRN    Past Medical History:     Allergies   Allergen Reactions    Penicillins Hives     Past Medical History:   Diagnosis Date    Arrhythmia     Essential hypertension 8/3/2021    Hypertension     Palpitations     Paroxysmal SVT (supraventricular tachycardia) 8/3/2021     Past Surgical History:   Procedure Laterality Date    APPENDECTOMY      ENDOMETRIAL ABLATION       Family History   Problem Relation Age of Onset    Melanoma Father     Heart disease Father 50    Coronary artery disease Paternal Grandfather        Home Medications:  Prior to Admission medications    Medication Sig Start Date End Date Taking? Authorizing Provider   atorvastatin (LIPITOR) 10 MG tablet Take 1 tablet by mouth every night at bedtime.   Yes Chalo Garcia MD   buPROPion SR (WELLBUTRIN SR) 150 MG 12 hr tablet Take 1 tablet by mouth.   Yes Chalo Garcia MD   cetirizine (zyrTEC) 10 MG tablet Take 1 tablet by mouth Daily.   Yes Chalo Garcia MD   Erenumab-aooe (AIMOVIG) 70 MG/ML auto-injector Inject 1 mL under the skin into the appropriate area as directed. 11/7/23  Yes Chalo Garcia MD   valACYclovir (VALTREX) 1000 MG tablet As Needed. 3/22/22  Yes Chalo Garcia MD   Aimovig 70 MG/ML prefilled syringe Every 30 (Thirty)  "Days. 8/1/21   Provider, MD Chalo        Social History:   Social History     Tobacco Use    Smoking status: Never    Smokeless tobacco: Never   Vaping Use    Vaping Use: Never used   Substance Use Topics    Alcohol use: Not Currently     Alcohol/week: 7.0 standard drinks of alcohol     Types: 7 Cans of beer per week    Drug use: Never         Review of Systems:  Review of Systems   Constitutional:  Negative for chills and fever.   HENT:  Negative for congestion, rhinorrhea and sore throat.    Eyes:  Negative for pain and visual disturbance.   Respiratory:  Negative for apnea, cough, chest tightness and shortness of breath.    Cardiovascular:  Negative for chest pain and palpitations.   Gastrointestinal:  Negative for abdominal pain, diarrhea, nausea and vomiting.   Genitourinary:  Negative for difficulty urinating and dysuria.   Musculoskeletal:  Negative for joint swelling and myalgias.   Skin:  Negative for color change.   Neurological:  Positive for seizures. Negative for headaches.   Psychiatric/Behavioral: Negative.     All other systems reviewed and are negative.       Physical Exam:  /74 (Patient Position: Lying)   Pulse 81   Temp 98.2 °F (36.8 °C) (Oral)   Resp 16   Ht 167.6 cm (66\")   Wt 63.7 kg (140 lb 6.9 oz)   SpO2 99%   BMI 22.67 kg/m²     Physical Exam  Vitals and nursing note reviewed.   Constitutional:       General: She is not in acute distress.     Appearance: Normal appearance. She is not toxic-appearing.   HENT:      Head: Normocephalic and atraumatic.      Jaw: There is normal jaw occlusion.   Eyes:      General: Lids are normal.      Extraocular Movements: Extraocular movements intact.      Conjunctiva/sclera: Conjunctivae normal.      Pupils: Pupils are equal, round, and reactive to light.   Cardiovascular:      Rate and Rhythm: Normal rate and regular rhythm.      Pulses: Normal pulses.      Heart sounds: Normal heart sounds.   Pulmonary:      Effort: Pulmonary effort is " normal. No respiratory distress.      Breath sounds: Normal breath sounds. No wheezing or rhonchi.   Abdominal:      General: Abdomen is flat.      Palpations: Abdomen is soft.      Tenderness: There is no abdominal tenderness. There is no guarding or rebound.   Musculoskeletal:         General: Normal range of motion.      Cervical back: Normal range of motion and neck supple.      Right lower leg: No edema.      Left lower leg: No edema.   Skin:     General: Skin is warm and dry.   Neurological:      Mental Status: She is alert and oriented to person, place, and time. Mental status is at baseline.   Psychiatric:         Mood and Affect: Mood normal.                  Procedures:  Procedures      Medical Decision Making:      Comorbidities that affect care:    Hypertension    External Notes reviewed:    Previous Clinic Note: Patient was last seen in clinic for paroxysmal SVT.      The following orders were placed and all results were independently analyzed by me:  Orders Placed This Encounter   Procedures    XR Chest 1 View    CT Head Without Contrast    [place] Draw    Comprehensive Metabolic Panel    Single High Sensitivity Troponin T    Magnesium    Urinalysis With Microscopic If Indicated (No Culture) - Urine, Clean Catch    CBC Auto Differential    CK    NPO Diet NPO Type: Strict NPO    Undress & Gown    Continuous Pulse Oximetry    Vital Signs    Orthostatic Blood Pressure    Oxygen Therapy- Nasal Cannula; Titrate 1-6 LPM Per SpO2; 90 - 95%    POC Glucose Once    ECG 12 Lead ED Triage Standing Order; Weak / Dizzy / AMS    Insert Peripheral IV    Fall Precautions    CBC & Differential    Green Top (Gel)    Lavender Top    Gold Top - SST    Light Blue Top       Medications Given in the Emergency Department:  Medications   sodium chloride 0.9 % flush 10 mL (has no administration in time range)   levETIRAcetam in NaCl 0.75% (KEPPRA) IVPB 1,000 mg (0 mg Intravenous Stopped 1/23/24 1526)   sodium chloride 0.9 %  bolus 1,000 mL (0 mL Intravenous Stopped 1/23/24 1638)   ketorolac (TORADOL) injection 30 mg (30 mg Intravenous Given 1/23/24 1648)   acetaminophen (TYLENOL) tablet 1,000 mg (1,000 mg Oral Given 1/23/24 1650)   sodium chloride 0.9 % bolus 1,000 mL (0 mL Intravenous Stopped 1/23/24 1736)   metoclopramide (REGLAN) injection 10 mg (10 mg Intravenous Given 1/23/24 1644)   diphenhydrAMINE (BENADRYL) injection 12.5 mg (12.5 mg Intravenous Given 1/23/24 1642)        ED Course:         Labs:    Lab Results (last 24 hours)       Procedure Component Value Units Date/Time    CBC & Differential [[id number]]  (Abnormal) Collected: 01/23/24 1359    Specimen: Blood Updated: 01/23/24 1415    Narrative:      The following orders were created for panel order CBC & Differential.  Procedure                               Abnormality         Status                     ---------                               -----------         ------                     CBC Auto Differential[[id number]]        Abnormal            Final result                 Please view results for these tests on the individual orders.    Comprehensive Metabolic Panel [[id number]] Collected: 01/23/24 1359    Specimen: Blood Updated: 01/23/24 1440     Glucose 89 mg/dL      BUN 18 mg/dL      Creatinine 0.80 mg/dL      Sodium 140 mmol/L      Potassium 4.2 mmol/L      Chloride 106 mmol/L      CO2 23.2 mmol/L      Calcium 9.6 mg/dL      Total Protein 7.3 g/dL      Albumin 4.5 g/dL      ALT (SGPT) 20 U/L      AST (SGOT) 15 U/L      Alkaline Phosphatase 51 U/L      Total Bilirubin 0.4 mg/dL      Globulin 2.8 gm/dL      A/G Ratio 1.6 g/dL      BUN/Creatinine Ratio 22.5     Anion Gap 10.8 mmol/L      eGFR 92.7 mL/min/1.73     Narrative:      GFR Normal >60  Chronic Kidney Disease <60  Kidney Failure <15      Single High Sensitivity Troponin T [[id number]]  (Normal) Collected: 01/23/24 1359    Specimen: Blood Updated: 01/23/24 1440     HS Troponin T <6 ng/L     Narrative:      High  Sensitive Troponin T Reference Range:  <14.0 ng/L- Negative Female for AMI  <22.0 ng/L- Negative Male for AMI  >=14 - Abnormal Female indicating possible myocardial injury.  >=22 - Abnormal Male indicating possible myocardial injury.   Clinicians would have to utilize clinical acumen, EKG, Troponin, and serial changes to determine if it is an Acute Myocardial Infarction or myocardial injury due to an underlying chronic condition.         Magnesium [[id number]]  (Normal) Collected: 01/23/24 1359    Specimen: Blood Updated: 01/23/24 1440     Magnesium 2.2 mg/dL     CBC Auto Differential [[id number]]  (Abnormal) Collected: 01/23/24 1359    Specimen: Blood Updated: 01/23/24 1415     WBC 7.09 10*3/mm3      RBC 4.34 10*6/mm3      Hemoglobin 13.5 g/dL      Hematocrit 40.7 %      MCV 93.8 fL      MCH 31.1 pg      MCHC 33.2 g/dL      RDW 12.6 %      RDW-SD 43.8 fl      MPV 10.3 fL      Platelets 282 10*3/mm3      Neutrophil % 44.9 %      Lymphocyte % 46.8 %      Monocyte % 6.3 %      Eosinophil % 1.1 %      Basophil % 0.8 %      Immature Grans % 0.1 %      Neutrophils, Absolute 3.17 10*3/mm3      Lymphocytes, Absolute 3.32 10*3/mm3      Monocytes, Absolute 0.45 10*3/mm3      Eosinophils, Absolute 0.08 10*3/mm3      Basophils, Absolute 0.06 10*3/mm3      Immature Grans, Absolute 0.01 10*3/mm3      nRBC 0.0 /100 WBC     CK [[id number]]  (Normal) Collected: 01/23/24 1359    Specimen: Blood Updated: 01/23/24 1451     Creatine Kinase 64 U/L     Urinalysis With Microscopic If Indicated (No Culture) - Urine, Clean Catch [[id number]]  (Abnormal) Collected: 01/23/24 1458    Specimen: Urine, Clean Catch Updated: 01/23/24 1510     Color, UA Yellow     Appearance, UA Cloudy     pH, UA 5.5     Specific Gravity, UA 1.024     Glucose, UA Negative     Ketones, UA Trace     Bilirubin, UA Negative     Blood, UA Negative     Protein, UA Negative     Leuk Esterase, UA Negative     Nitrite, UA Negative     Urobilinogen, UA 0.2 E.U./dL    Narrative:       Urine microscopic not indicated.             Imaging:    CT Head Without Contrast    Result Date: 1/23/2024  PROCEDURE: CT HEAD WO CONTRAST  COMPARISON:  None INDICATIONS: headache  PROTOCOL:   Standard imaging protocol performed    RADIATION:   DLP: 1017.2 mGy*cm   MA and/or KV was adjusted to minimize radiation dose.     TECHNIQUE: After obtaining the patient's consent, CT images were obtained without non-ionic intravenous contrast material.  FINDINGS:  No evidence of acute intracranial hemorrhage, mass, midline shift, loss of gray-white matter differentiation, or extra-axial fluid collection.  Normal ventricular volume.  Basal cisterns are patent.  No evidence of fracture or suspicious osseous lesion.  The visualized soft tissues are unremarkable.  The paranasal sinuses and mastoid air cells are well aerated.       No acute intracranial abnormality.     ROSARIO SWAN MD       Electronically Signed and Approved By: ROSARIO SWAN MD on 1/23/2024 at 14:48             XR Chest 1 View    Result Date: 1/23/2024  PROCEDURE: XR CHEST 1 VW  COMPARISON: None  INDICATIONS: thinks she has series of seizures last night  FINDINGS: Clear lungs.  Normal heart size.  No pleural effusion, pneumothorax, or acute osseous abnormality.        No acute chest finding.       CHEPE GRANDE MD       Electronically Signed and Approved By: CHEPE GRANDE MD on 1/23/2024 at 12:29                Differential Diagnosis and Discussion:    Seizure: Differential diagnosis includes but is not limited to meningitis, hypoglycemia, electrolyte abnormalities, intracranial hemorrhage, toxin induced, and pseudoseizure.    All labs were reviewed and interpreted by me.  CT scan radiology impression was interpreted by me.    MDM     Amount and/or Complexity of Data Reviewed  Clinical lab tests: reviewed  Tests in the radiology section of CPT®: reviewed  Tests in the medicine section of CPT®: reviewed       The patient is resting comfortably and feels better,  is alert, talkative and in no distress. The repeat examination is unremarkable and benign.  The patient´s CBC that was reviewed and interpreted by me shows no abnormalities of critical concern. Of note, there is no anemia requiring a blood transfusion and the platelet count is acceptable.  The patient´s CMP that was reviewed and interpretted by me shows no abnormalities of critical concern. Of note, the patient´s sodium and potassium are acceptable. The patient´s liver enzymes are unremarkable. The patient´s renal function (creatinine) is preserved. The patient has a normal anion gap.  CT head is negative for acute intracranial abnormalities.  Patient was given 1 L normal saline bolus and Keppra in the emergency department.  The patient is neurologically intact, has a normal mental status and this ambulatory in the ED. The history, exam, diagnostic testing in the patient's current condition do not suggest meningitis, stroke, sepsis, subarachnoid hemorrhage, intracranial bleeding, encephalitis or other significant pathology that would warrant further testing, continued ED treatment, admission, neurological consultation, or other specialist evaluation at this point. The vital signs have been stable. The patient's condition is stable and appropriate for discharge. The patient will pursue further outpatient evaluation with the primary care physician or other designated or consulting position as indicated in the discharge instructions.          Patient Care Considerations:    MRI: I considered ordering an MRI however patient is at baseline and has a normal neurological exam.      Consultants/Shared Management Plan:    None    Social Determinants of Health:    Patient is independent, reliable, and has access to care.       Disposition and Care Coordination:    Discharged: I considered escalation of care by admitting this patient to the hospital, however the patient has improved and is suitable and  stable for  discharge.    I have explained the patient´s condition, diagnoses and treatment plan based on the information available to me at this time. I have answered questions and addressed any concerns. The patient has a good  understanding of the patient´s diagnosis, condition, and treatment plan as can be expected at this point. The vital signs have been stable. The patient´s condition is stable and appropriate for discharge from the emergency department.      The patient will pursue further outpatient evaluation with the primary care physician or other designated or consulting physician as outlined in the discharge instructions. They are agreeable to this plan of care and follow-up instructions have been explained in detail. The patient has received these instructions in written format and have expressed an understanding of the discharge instructions. The patient is aware that any significant change in condition or worsening of symptoms should prompt an immediate return to this or the closest emergency department or call to 911.  I have explained discharge medications and the need for follow up with the patient/caretakers. This was also printed in the discharge instructions. Patient was discharged with the following medications and follow up:      Medication List        New Prescriptions      ketorolac 10 MG tablet  Commonly known as: TORADOL  Take 1 tablet by mouth Every 6 (Six) Hours As Needed for Moderate Pain.     levETIRAcetam 500 MG tablet  Commonly known as: KEPPRA  Take 1 tablet by mouth 2 (Two) Times a Day.               Where to Get Your Medications        These medications [place] [facility] [place] [address] - [phone] [facility] [phone] FX  [address], [place] [address]      Phone: [phone]   ketorolac 10 MG tablet  levETIRAcetam 500 MG tablet      OropillBruno kauffman MD  [address]  [place]  [address]  [phone]    In 2 days         Final diagnoses:   Seizure        ED Disposition       ED Disposition   Discharge    Condition   Stable    Comment   --               This medical record created using voice recognition software.             Harpreet Dove MD  01/23/24 6810

## 2024-01-24 ENCOUNTER — TELEPHONE (OUTPATIENT)
Dept: NEUROLOGY | Facility: CLINIC | Age: 44
End: 2024-01-24
Payer: COMMERCIAL

## 2024-01-24 NOTE — TELEPHONE ENCOUNTER
Caller: Beatriz Larios    Relationship to patient: Self    Best call back number: [phone]     New or established patient?  [x] New  [] Established    Date of discharge:1/23/24    Facility discharged from:  [facility]    Diagnosis/Symptoms: SZ    Length of stay (If applicable): 1 DAY    Specialty Only: Did you see a [facility] provider?    [] Yes  [x] No  If so, who? N/A     PATIENT IS REQUSTING 1/2 DAY HOSP F/U W/ [facility] FOR DX:SZ.    PLEASE REVIEW & ADVISE-THANK YOU

## 2024-01-26 NOTE — TELEPHONE ENCOUNTER
Called patient with appt date and time, patient cancelled appt stating she has an earlier appt scheduled at [facility]

## 2024-01-30 LAB
QT INTERVAL: 399 MS
QTC INTERVAL: 414 MS

## 2024-07-25 ENCOUNTER — TELEPHONE (OUTPATIENT)
Dept: ORTHOPEDIC SURGERY | Facility: CLINIC | Age: 44
End: 2024-07-25

## 2024-07-26 ENCOUNTER — OFFICE VISIT (OUTPATIENT)
Dept: ORTHOPEDIC SURGERY | Facility: CLINIC | Age: 44
End: 2024-07-26
Payer: OTHER MISCELLANEOUS

## 2024-07-26 VITALS
BODY MASS INDEX: 23.3 KG/M2 | WEIGHT: 145 LBS | SYSTOLIC BLOOD PRESSURE: 133 MMHG | OXYGEN SATURATION: 98 % | HEART RATE: 90 BPM | HEIGHT: 66 IN | DIASTOLIC BLOOD PRESSURE: 89 MMHG

## 2024-07-26 DIAGNOSIS — S82.54XA CLOSED NONDISPLACED FRACTURE OF MEDIAL MALLEOLUS OF RIGHT TIBIA, INITIAL ENCOUNTER: Primary | ICD-10-CM

## 2024-07-26 RX ORDER — TRAMADOL HYDROCHLORIDE 50 MG/1
TABLET ORAL
COMMUNITY
Start: 2024-07-23

## 2024-07-26 NOTE — PROGRESS NOTES
"Chief Complaint  Initial Evaluation of the Right Ankle     Subjective      Beatriz Larios presents to [facility] for initial evaluation of the right ankle.  She was  working out and she jumped up and when she came down she landed on the side of her ankle She went to  on 7/22/24. She was at a training for work.  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     Allergies   Allergen Reactions    Penicillins Hives        Social History     Socioeconomic History    Marital status:    Tobacco Use    Smoking status: Never    Smokeless tobacco: Never   Vaping Use    Vaping status: Never Used   Substance and Sexual Activity    Alcohol use: Not Currently     Alcohol/week: 7.0 standard drinks of alcohol     Types: 7 Cans of beer per week    Drug use: Never    Sexual activity: Defer     Birth control/protection: Vasectomy        I reviewed the patient's chief complaint, history of present illness, review of systems, past medical history, surgical history, family history, social history, medications, and allergy list.     Review of Systems     Constitutional: Denies fevers, chills, weight loss  Cardiovascular: Denies chest pain, shortness of breath  Skin: Denies rashes, acute skin changes  Neurologic: Denies headache, loss of consciousness  MSK: Right ankle pain.       Vital Signs:   /89   Pulse 90   Ht 167.6 cm (65.98\")   Wt 65.8 kg (145 lb)   SpO2 98%   BMI 23.41 kg/m²          Physical Exam  General: Alert. No acute distress    Ortho Exam        RIGHT ANKLE She is in a splint. Positive EHL, FHL, GS and TA. Sensation intact to all 5 nerves of the foot. Positive pulses.   Stable to stress. Tender to the fracture site. . Intact flexion and extension of toes.  Mild swelling. Achilles intact. Tender to the fracture site. Mild swelling.       Orthopedic Injury Treatment    Date/Time: 7/26/2024 1:43 PM    Performed by: Viry Montemayor  Authorized by: Albert Nettles MD  Injury location: " ankle  Location details: right ankle  Pre-procedure neurovascular assessment: neurovascularly intact    Anesthesia:  Local anesthesia used: no    Sedation:  Patient sedated: no    Immobilization: cast  Splint type: short leg  Supplies used: cotton padding  Post-procedure neurovascular assessment: post-procedure neurovascularly intact  Patient tolerance: patient tolerated the procedure well with no immediate complications  Comments: Closed treatment was obtained and fiberglass cast was applied.  The patient tolerated the procedure without any complications. Fiberglass cast applied by Viry Montemayor CMA.          Imaging Results (Most Recent)       Procedure Component Value Units Date/Time    XR Ankle 3+ View Right [[id number]] Resulted: 07/26/24 1328     Updated: 07/26/24 1329             Result Review :     X-Ray Report:  Right ankle(s) X-Ray  Indication: Evaluation of the right ankle  AP/Lateral view(s)  Findings: Fracture through the base of the medial malleolus with routine alignment.   Prior studies available for comparison: no       XR Ankle 3+ View Right    Result Date: 7/22/2024  Narrative: XR ANKLE 3+ VW RIGHT Date of Exam: 7/22/2024 4:05 PM EDT Indication: injury/pain/swelling Comparison: None available. Findings: Sagittally oriented fracture through the base of the medial malleolus. The lateral malleolus is intact. Spurring at the insertion of the Achilles tendon. Calcaneal spur. The tarsal and visualized metatarsal bones are intact.     Impression: Sagittally oriented fracture through the base of the medial malleolus. Electronically Signed: Winston Pascal MD  7/22/2024 4:32 PM EDT  Workstation ID: [id number]            Assessment and Plan     Diagnoses and all orders for this visit:    1. Closed nondisplaced fracture of medial malleolus of right tibia, initial encounter (Primary)  -     XR Ankle 3+ View Right        Discussed the treatment plan with the patient. I reviewed the X-rays that were obtained today  with the patient.     Short leg cast.  Cast care was educated on.  Elevate above heart to reduce swelling.     Work note given.     Will obtain X-Rays at next visit over the cast    Call or return if worsening symptoms.    Follow Up     2-3 weeks with X ray over the cast.  Will be in a cast for 4 weeks.       Patient was given instructions and counseling regarding her condition or for health maintenance advice. Please see specific information pulled into the AVS if appropriate.     Scribed for Albert Nettles MD by Diane Carty MA.  07/26/24   13:10 EDT    I have personally performed the services described in this document as scribed by the above individual and it is both accurate and complete. Albert Nettles MD 07/26/24

## 2024-08-12 ENCOUNTER — OFFICE VISIT (OUTPATIENT)
Dept: ORTHOPEDIC SURGERY | Facility: CLINIC | Age: 44
End: 2024-08-12
Payer: OTHER MISCELLANEOUS

## 2024-08-12 VITALS
DIASTOLIC BLOOD PRESSURE: 99 MMHG | HEART RATE: 94 BPM | SYSTOLIC BLOOD PRESSURE: 144 MMHG | WEIGHT: 145 LBS | HEIGHT: 65 IN | OXYGEN SATURATION: 98 % | BODY MASS INDEX: 24.16 KG/M2

## 2024-08-12 DIAGNOSIS — S82.54XA CLOSED NONDISPLACED FRACTURE OF MEDIAL MALLEOLUS OF RIGHT TIBIA, INITIAL ENCOUNTER: Primary | ICD-10-CM

## 2024-08-12 PROCEDURE — 99024 POSTOP FOLLOW-UP VISIT: CPT | Performed by: ORTHOPAEDIC SURGERY

## 2024-08-12 PROCEDURE — 29405 APPL SHORT LEG CAST: CPT | Performed by: ORTHOPAEDIC SURGERY

## 2024-08-12 NOTE — PROGRESS NOTES
"Chief Complaint  Follow-up of the Right Ankle     Subjective      Beatriz Larios presents to [facility] for follow up of the right ankle.    She was  working out and she jumped up and when she came down she landed on the side of her ankle She went to  on 7/22/24. She was at a training for work.  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 She was placed in a short leg cast on 7/26/24.  She is here today for a X ray over the cast.  Short leg cast taken off and new cast put on due to decrease swelling.     Allergies   Allergen Reactions    Penicillins Hives        Social History     Socioeconomic History    Marital status:    Tobacco Use    Smoking status: Never    Smokeless tobacco: Never   Vaping Use    Vaping status: Never Used   Substance and Sexual Activity    Alcohol use: Not Currently     Alcohol/week: 7.0 standard drinks of alcohol    Drug use: Never    Sexual activity: Yes     Partners: Male     Birth control/protection: Vasectomy        I reviewed the patient's chief complaint, history of present illness, review of systems, past medical history, surgical history, family history, social history, medications, and allergy list.     Review of Systems     Constitutional: Denies fevers, chills, weight loss  Cardiovascular: Denies chest pain, shortness of breath  Skin: Denies rashes, acute skin changes  Neurologic: Denies headache, loss of consciousness        Vital Signs:   /99   Pulse 94   Ht 165.1 cm (65\")   Wt 65.8 kg (145 lb)   SpO2 98%   BMI 24.13 kg/m²          Physical Exam  General: Alert. No acute distress    Ortho Exam        RIGHT ANKLE She is in a cast.  Loosening of the cast due to decrease swelling. Cast is intact and no wound concerns around the top and bottom of the cast. . Positive EHL, FHL, GS and TA. Sensation intact to all 5 nerves of the foot. Positive pulses.   Stable to stress. Tender to the fracture site. . Intact flexion and extension of toes.  Mild " swelling. Achilles intact. Tender to the fracture site. Mild swelling.           Orthopedic Injury Treatment    Date/Time: 8/12/2024 2:17 PM    Performed by: Sarai Patel MA  Authorized by: Albert Nettles MD  Injury location: ankle  Location details: right ankle  Pre-procedure neurovascular assessment: neurovascularly intact    Anesthesia:  Local anesthesia used: no    Sedation:  Patient sedated: no    Immobilization: cast (short leg)  Supplies used: Fiberglass.  Post-procedure neurovascular assessment: post-procedure neurovascularly intact  Patient tolerance: patient tolerated the procedure well with no immediate complications  Comments: Patient was placed in fiberglass cast today.  The patient tolerated the procedure without any complications.            Imaging Results (Most Recent)       Procedure Component Value Units Date/Time    XR Ankle 2 View Right [[id number]] Resulted: 08/12/24 1307     Updated: 08/12/24 1312             Result Review :     X-Ray Report:  Right ankle(s) X-Ray  Indication: Evaluation of the right ankle  AP/Lateral view(s)  Findings: Fracture through the base of the medial malleolus with routine alignment. No changes from previous imaging.   Prior studies available for comparison: yes       XR Ankle 3+ View Right    Result Date: 7/26/2024  Narrative: X-Ray Report: Right ankle(s) X-Ray Indication: Evaluation of the right ankle AP/Lateral view(s) Findings: Fracture through the base of the medial malleolus with routine alignment. Prior studies available for comparison: no     XR Ankle 3+ View Right    Result Date: 7/22/2024  Narrative: XR ANKLE 3+ VW RIGHT Date of Exam: 7/22/2024 4:05 PM EDT Indication: injury/pain/swelling Comparison: None available. Findings: Sagittally oriented fracture through the base of the medial malleolus. The lateral malleolus is intact. Spurring at the insertion of the Achilles tendon. Calcaneal spur. The tarsal and visualized metatarsal bones are intact.      Impression: Sagittally oriented fracture through the base of the medial malleolus. Electronically Signed: Winston Pascal MD  7/22/2024 4:32 PM EDT  Workstation ID: [id number]            Assessment and Plan     Diagnoses and all orders for this visit:    1. Closed nondisplaced fracture of medial malleolus of right tibia, initial encounter (Primary)  -     XR Ankle 2 View Right        Discussed the treatment plan with the patient. I reviewed the X-rays that were obtained today with the patient.     Short leg cast removed and and new one applied.      Will obtain X-Rays of the right ankle at next visit after the cast is removed.     Call or return if worsening symptoms.    Follow Up     2-3 weeks with X ray after the cast is off. Will most likely go into a CAM boot.       Patient was given instructions and counseling regarding her condition or for health maintenance advice. Please see specific information pulled into the AVS if appropriate.     Scribed for Albert Nettles MD by Diane Carty MA.  08/12/24   13:13 EDT    I have personally performed the services described in this document as scribed by the above individual and it is both accurate and complete. Albert Nettles MD 08/12/24

## 2024-08-28 ENCOUNTER — OFFICE VISIT (OUTPATIENT)
Dept: ORTHOPEDIC SURGERY | Facility: CLINIC | Age: 44
End: 2024-08-28
Payer: OTHER MISCELLANEOUS

## 2024-08-28 VITALS — HEIGHT: 65 IN | BODY MASS INDEX: 24.16 KG/M2 | WEIGHT: 145 LBS

## 2024-08-28 DIAGNOSIS — S82.891D CLOSED FRACTURE OF RIGHT ANKLE WITH ROUTINE HEALING, SUBSEQUENT ENCOUNTER: ICD-10-CM

## 2024-08-28 DIAGNOSIS — M25.571 RIGHT ANKLE PAIN, UNSPECIFIED CHRONICITY: Primary | ICD-10-CM

## 2024-08-29 NOTE — PATIENT INSTRUCTIONS
X-rays taken and reviewed with patient.  Fracture is stable and healing routinely.  No acute fractures noted on the lateral malleolus.    Home exercises given to patient today.  Cast was removed and patient transition to a walking boot.  Advised that if she is unable to bear weight due to pain then to keep nonweightbearing status until reevaluation and MRI.    Discussed getting an MRI with patient to look at lateral malleolus.  Patient would like to proceed with this.  Order was placed today in office.    Discussed physical therapy versus home exercises with patient patient would like to proceed with home exercises at this time until after MRI.    Follow-up in 3 weeks or after MRI, whichever comes first.  Call for questions, concerns or worsening symptoms.  Repeat x-ray needed.

## 2024-08-29 NOTE — PROGRESS NOTES
"Chief Complaint  Follow-up of the Right Ankle and Cast Removal    Subjective      Beatriz Larios presents to [facility] for follow-up of right medial malleolus fracture occurred on 7/22/2024 as she jumped and came down landing on her side of the ankle.  Patient presents in a short leg cast which was removed in office today.  Reports that her medial malleolus was feeling fine, however she is having extreme pain in her lateral malleolus.  X-ray unremarkable in this area.  She is unable to bear weight due to pain.  She presents today nonweightbearing with use of crutches.  She has been taking Tylenol for pain.  She is here today for follow-up.    Objective   Allergies   Allergen Reactions    Penicillins Hives       Vital Signs:   Ht 165.1 cm (65\")   Wt 65.8 kg (145 lb)   BMI 24.13 kg/m²       Physical Exam    Constitutional: Awake, alert. Well nourished appearance.    Integumentary: Warm, dry, intact. No obvious rashes.    HENT: Atraumatic, normocephalic.   Respiratory: Non labored respirations .   Cardiovascular: Intact peripheral pulses.    Psychiatric: Normal mood and affect. A&O X3    Ortho Exam  Right ankle: Edema noted on the lateral aspect of the ankle around the lateral malleoli.  Tender to palpation and erythema noted on the lateral malleolus.  Nontender to palpation of fracture site.  No edema on the medial ankle.  Extremely limited range of motion with dorsiflexion, plantarflexion, inversion and eversion.  Altered sensation noted throughout the toes.  Normal sensation in the foot.  Capillary refill time is brisk.  No edema noted in the foot or toes.    Imaging Results (Most Recent)       Procedure Component Value Units Date/Time    XR Ankle 3+ View Right [[id number]] Resulted: 08/29/24 0758     Updated: 08/29/24 0758    Narrative:      X-Ray Report:  Study: X-rays ordered, taken in the office, and reviewed today.   Site: Right ankle xray  Indication: Medial malleolus " fracture  View: AP/Lateral view(s)  Findings: Medial malleolus fracture with stable alignment of routine   healing noted.  No acute fractures noted with the distal fibula.  Prior studies available for comparison: yes                       Assessment and Plan   Problem List Items Addressed This Visit    None  Visit Diagnoses       Right ankle pain, unspecified chronicity    -  Primary    Relevant Orders    XR Ankle 3+ View Right (Completed)    Closed fracture of right ankle with routine healing, subsequent encounter        Relevant Orders    MRI Ankle Right Without Contrast            Follow Up   Return in about 3 weeks (around 9/18/2024) for Recheck.    Patient is a non-smoker, did not discuss options for smoking cessation.     Social History     Socioeconomic History    Marital status:    Tobacco Use    Smoking status: Never    Smokeless tobacco: Never   Vaping Use    Vaping status: Never Used   Substance and Sexual Activity    Alcohol use: Not Currently     Alcohol/week: 7.0 standard drinks of alcohol    Drug use: Never    Sexual activity: Yes     Partners: Male     Birth control/protection: Vasectomy       Patient Instructions   X-rays taken and reviewed with patient.  Fracture is stable and healing routinely.  No acute fractures noted on the lateral malleolus.    Home exercises given to patient today.  Cast was removed and patient transition to a walking boot.  Advised that if she is unable to bear weight due to pain then to keep nonweightbearing status until reevaluation and MRI.    Discussed getting an MRI with patient to look at lateral malleolus.  Patient would like to proceed with this.  Order was placed today in office.    Discussed physical therapy versus home exercises with patient patient would like to proceed with home exercises at this time until after MRI.    Follow-up in 3 weeks or after MRI, whichever comes first.  Call for questions, concerns or worsening symptoms.  Repeat x-ray  needed.  Patient was given instructions and counseling regarding her condition or for health maintenance advice. Please see specific information pulled into the AVS if appropriate.

## 2024-08-30 ENCOUNTER — TELEPHONE (OUTPATIENT)
Dept: ORTHOPEDIC SURGERY | Facility: CLINIC | Age: 44
End: 2024-08-30

## 2024-08-30 NOTE — TELEPHONE ENCOUNTER
Provider: DR. LOZANO    Caller: LUCIO    Relationship to Patient: ROLDAN    Phone Number: [phone]    Reason for Call:TACY WITH [employer] CALLED STATING SHE NEEDS MRI ORDER SO THAT SHE CAN APPROVE IT. PLEASE ADVISE.    DX: Closed fracture of right ankle with routine healing, subsequent encounter     FAX: [phone]

## 2024-09-04 ENCOUNTER — TELEPHONE (OUTPATIENT)
Dept: ORTHOPEDIC SURGERY | Facility: CLINIC | Age: 44
End: 2024-09-04
Payer: OTHER MISCELLANEOUS

## 2024-09-04 NOTE — TELEPHONE ENCOUNTER
Caller: TAN    Relationship: SELF    Best call back number: [phone]    What orders are you requesting (i.e. lab or imaging): MRI RIGHT ANKLE- PLEASE SEND TO [facility] IN [facility]- PHONE [phone]- FIRST AVAILABLE APPT WITH [facility] 10/2/24- WITH GEORGE AS WELL-     In what timeframe would the patient need to come in: [facility] SAID THEY COULD SCHEDULE AS SOON AS NEXT WEEK

## 2024-09-05 NOTE — TELEPHONE ENCOUNTER
CALLED AND INFORMED PATIENT MRI ORDER AND AUTH HAVE BEEN FAXED TO [facility] - PATIENT STATES SHE WILL CALL TO SCHEDULE

## 2024-09-13 DIAGNOSIS — S82.891D CLOSED FRACTURE OF RIGHT ANKLE WITH ROUTINE HEALING, SUBSEQUENT ENCOUNTER: ICD-10-CM

## 2024-09-18 ENCOUNTER — OFFICE VISIT (OUTPATIENT)
Dept: ORTHOPEDIC SURGERY | Facility: CLINIC | Age: 44
End: 2024-09-18
Payer: OTHER MISCELLANEOUS

## 2024-09-18 VITALS
HEIGHT: 65 IN | BODY MASS INDEX: 24.16 KG/M2 | HEART RATE: 91 BPM | DIASTOLIC BLOOD PRESSURE: 94 MMHG | SYSTOLIC BLOOD PRESSURE: 151 MMHG | OXYGEN SATURATION: 98 % | WEIGHT: 145 LBS

## 2024-09-18 DIAGNOSIS — S82.54XA NONDISPLACED FRACTURE OF MEDIAL MALLEOLUS OF RIGHT TIBIA, INITIAL ENCOUNTER FOR CLOSED FRACTURE: ICD-10-CM

## 2024-09-18 DIAGNOSIS — Q72.51: ICD-10-CM

## 2024-09-18 DIAGNOSIS — S82.891D CLOSED FRACTURE OF RIGHT ANKLE WITH ROUTINE HEALING, SUBSEQUENT ENCOUNTER: Primary | ICD-10-CM

## 2024-09-18 DIAGNOSIS — Q72.61: ICD-10-CM

## 2024-09-18 DIAGNOSIS — S99.911D INJURY OF RIGHT ANKLE, SUBSEQUENT ENCOUNTER: Primary | ICD-10-CM

## 2024-09-18 RX ORDER — DICLOFENAC SODIUM 75 MG/1
75 TABLET, DELAYED RELEASE ORAL 2 TIMES DAILY
Qty: 60 TABLET | Refills: 2 | Status: SHIPPED | OUTPATIENT
Start: 2024-09-18

## 2024-10-16 ENCOUNTER — OFFICE VISIT (OUTPATIENT)
Dept: ORTHOPEDIC SURGERY | Facility: CLINIC | Age: 44
End: 2024-10-16
Payer: OTHER MISCELLANEOUS

## 2024-10-16 VITALS
WEIGHT: 145 LBS | OXYGEN SATURATION: 98 % | HEART RATE: 100 BPM | HEIGHT: 65 IN | DIASTOLIC BLOOD PRESSURE: 103 MMHG | BODY MASS INDEX: 24.16 KG/M2 | SYSTOLIC BLOOD PRESSURE: 156 MMHG

## 2024-10-16 DIAGNOSIS — S82.54XD CLOSED NONDISPLACED FRACTURE OF MEDIAL MALLEOLUS OF RIGHT TIBIA WITH ROUTINE HEALING, SUBSEQUENT ENCOUNTER: Primary | ICD-10-CM

## 2024-10-16 NOTE — PROGRESS NOTES
"Chief Complaint  Follow-up of the Right Ankle       Subjective      Beatriz Larios presents to [facility] for a follow up for her right ankle. She has been treating her right ankle fracture conservatively in a short leg cast and boot. Her injury occurred on 07/22/24. She presents today in a tall walking boot and ambulating with crutches. She has been attending outpatient physical therapy without any relief. She locates her pain to the top of her foot. She states she is not able to weight bear out of her boot. She presents today in K tape from physical therapy. This is a work comp injury.     Allergies   Allergen Reactions    Penicillins Hives        Social History     Socioeconomic History    Marital status:    Tobacco Use    Smoking status: Never    Smokeless tobacco: Never   Vaping Use    Vaping status: Never Used   Substance and Sexual Activity    Alcohol use: Not Currently     Alcohol/week: 7.0 standard drinks of alcohol    Drug use: Never    Sexual activity: Yes     Partners: Male     Birth control/protection: Vasectomy        I reviewed the patient's chief complaint, history of present illness, review of systems, past medical history, surgical history, family history, social history, medications, and allergy list.     Review of Systems     Constitutional: Denies fevers, chills, weight loss  Cardiovascular: Denies chest pain, shortness of breath  Skin: Denies rashes, acute skin changes  Neurologic: Denies headache, loss of consciousness  MSK: right ankle pain       Vital Signs:   BP (!) 156/103   Pulse 100   Ht 165.1 cm (65\")   Wt 65.8 kg (145 lb)   SpO2 98%   BMI 24.13 kg/m²          Physical Exam  General: Alert. No acute distress    Ortho Exam        Right lower extremity: k tape in place today, extremely limited range of motion with dorsiflexion, plantarflexion, inversion and eversion. Tender to palpation along the lateral malleolus and over the ATFL region. Nontender " to palpation of fracture site. Mild edema generalized along the lateral malleolus     Procedures        Imaging Results (Most Recent)       None             Result Review :       No results found.           Assessment and Plan     Diagnoses and all orders for this visit:    1. Closed nondisplaced fracture of medial malleolus of right tibia with routine healing, subsequent encounter (Primary)        The patient presents here today for a follow up for her right ankle fracture.     Referral placed today for Dr. Bautista for a second opinion of her ankle and foot.      Advised patient to discontinue the boot.     Work note provided today for the patient.     Call or return if worsening symptoms.    Follow Up     8 weeks     Patient was given instructions and counseling regarding her condition or for health maintenance advice. Please see specific information pulled into the AVS if appropriate.     Scribed for Albert Nettles MD by Viry Montemayor.  10/16/24   11:41 EDT    I have personally performed the services described in this document as scribed by the above individual and it is both accurate and complete. Albert Nettles MD 10/18/24

## 2024-10-17 ENCOUNTER — TELEPHONE (OUTPATIENT)
Dept: ORTHOPEDIC SURGERY | Facility: CLINIC | Age: 44
End: 2024-10-17

## 2024-10-17 NOTE — TELEPHONE ENCOUNTER
Caller: ROLDAN    Relationship: [facility]    Best call back number: [phone]    What form or medical record are you requesting: REFERRAL TO POD    Who is requesting this form or medical record from you: [facility]    How would you like to receive the form or medical records (pick-up, mail, fax): FAX  If fax, what is the fax number: [phone]      Timeframe paperwork needed: ASAP    Additional notes: W/C NEEDS REFERRAL SO THEY CAN SEND AUTH TO POD TO BE TREATED, PT HAS A FX

## 2024-10-21 ENCOUNTER — OFFICE VISIT (OUTPATIENT)
Dept: PODIATRY | Facility: CLINIC | Age: 44
End: 2024-10-21
Payer: OTHER MISCELLANEOUS

## 2024-10-21 VITALS
BODY MASS INDEX: 24.13 KG/M2 | WEIGHT: 145 LBS | SYSTOLIC BLOOD PRESSURE: 137 MMHG | OXYGEN SATURATION: 96 % | TEMPERATURE: 98 F | DIASTOLIC BLOOD PRESSURE: 93 MMHG | HEART RATE: 106 BPM

## 2024-10-21 DIAGNOSIS — M79.2 NEURITIS: Primary | ICD-10-CM

## 2024-10-21 DIAGNOSIS — S93.331A SUBLUXATION OF PERONEAL TENDON OF RIGHT FOOT: ICD-10-CM

## 2024-10-21 DIAGNOSIS — M25.871 ANKLE IMPINGEMENT SYNDROME, RIGHT: ICD-10-CM

## 2024-10-21 PROCEDURE — 99203 OFFICE O/P NEW LOW 30 MIN: CPT | Performed by: PODIATRIST

## 2024-10-21 RX ORDER — VALACYCLOVIR HYDROCHLORIDE 1 G/1
1000 TABLET, FILM COATED ORAL
COMMUNITY
Start: 2024-10-16

## 2024-10-21 NOTE — PROGRESS NOTES
[facility]    Today's Date: 10/21/24    Patient Name: Beatriz Larios  MRN: [id number]  CSN: [id number]  PCP: Pooja Haque APRN,  Referring Provider: Albert Nettles MD    SUBJECTIVE     Chief Complaint   Patient presents with    Right Foot - Establish Care     Worker comp case. She had someone fall on her right ankle this past July.  She has had a MRI and xray. She is currently using crutches. She has been in a cast and air boot. Her foot gets cold a lot     HPI: Beatriz Larios, a 44 y.o.female, presents to clinic.    Patient is a 44-year-old female presenting with right ankle discomfort.  Patient states it hurts to walk and she feels like something is going on top of her ankle and around it when she is walking.  It can hurt up the outside of her calf with the feeling of coldness in her ankle.  She was previously treated for a medial malleoli are fracture.  Currently in physical therapy.    Past Medical History:   Diagnosis Date    Arrhythmia     Essential hypertension 08/03/2021    Fracture, tibia and fibula 7/22/24    Hypertension     Palpitations     Paroxysmal SVT (supraventricular tachycardia) 08/03/2021     Past Surgical History:   Procedure Laterality Date    APPENDECTOMY      ENDOMETRIAL ABLATION       Family History   Problem Relation Age of Onset    Melanoma Father     Heart disease Father 50    Cancer Father         Melanomas    Coronary artery disease Paternal Grandfather      Social History     Socioeconomic History    Marital status:    Tobacco Use    Smoking status: Never    Smokeless tobacco: Never   Vaping Use    Vaping status: Never Used   Substance and Sexual Activity    Alcohol use: Not Currently     Alcohol/week: 7.0 standard drinks of alcohol    Drug use: Never    Sexual activity: Yes     Partners: Male     Birth control/protection: Vasectomy     Allergies   Allergen Reactions    Penicillins Hives     Current Outpatient Medications   Medication Sig  Dispense Refill    atorvastatin (LIPITOR) 10 MG tablet Take 1 tablet by mouth Daily.      buPROPion XL (WELLBUTRIN XL) 300 MG 24 hr tablet Take 1 tablet by mouth Every Morning.      cetirizine (zyrTEC) 10 MG tablet Take 1 tablet by mouth Daily.      diclofenac (VOLTAREN) 75 MG EC tablet Take 1 tablet by mouth 2 (Two) Times a Day. 60 tablet 2    Erenumab-aooe (AIMOVIG) 70 MG/ML auto-injector Inject 1 mL under the skin into the appropriate area as directed.      Ibuprofen 3 %, Gabapentin 10 %, Baclofen 2 %, lidocaine 4 %, Ketamine HCl 4 % Apply 1-2 g topically to the appropriate area as directed 3 (Three) to 4 (Four) times daily. 90 g 1    levETIRAcetam XR (KEPPRA XR) 500 MG tablet Take 2 tablets by mouth Daily.      topiramate (TOPAMAX) 25 MG tablet Take 1 tablet by mouth Every 12 (Twelve) Hours.      valACYclovir (VALTREX) 1000 MG tablet Take 1 tablet by mouth.      valACYclovir (VALTREX) 1000 MG tablet As Needed. (Patient not taking: Reported on 10/21/2024)       No current facility-administered medications for this visit.     Review of Systems    OBJECTIVE     Vitals:    10/21/24 1300   BP: 137/93   Pulse: 106   Temp: 98 °F (36.7 °C)   SpO2: 96%       WBC   Date Value Ref Range Status   01/23/2024 7.09 3.40 - 10.80 10*3/mm3 Final     RBC   Date Value Ref Range Status   01/23/2024 4.34 3.77 - 5.28 10*6/mm3 Final     Hemoglobin   Date Value Ref Range Status   01/23/2024 13.5 12.0 - 15.9 g/dL Final     Hematocrit   Date Value Ref Range Status   01/23/2024 40.7 34.0 - 46.6 % Final     MCV   Date Value Ref Range Status   01/23/2024 93.8 79.0 - 97.0 fL Final     MCH   Date Value Ref Range Status   01/23/2024 31.1 26.6 - 33.0 pg Final     MCHC   Date Value Ref Range Status   01/23/2024 33.2 31.5 - 35.7 g/dL Final     RDW   Date Value Ref Range Status   01/23/2024 12.6 12.3 - 15.4 % Final     RDW-SD   Date Value Ref Range Status   01/23/2024 43.8 37.0 - 54.0 fl Final     MPV   Date Value Ref Range Status   01/23/2024 10.3  6.0 - 12.0 fL Final     Platelets   Date Value Ref Range Status   01/23/2024 282 140 - 450 10*3/mm3 Final     Neutrophil %   Date Value Ref Range Status   01/23/2024 44.9 42.7 - 76.0 % Final     Lymphocyte %   Date Value Ref Range Status   01/23/2024 46.8 (H) 19.6 - 45.3 % Final     Monocyte %   Date Value Ref Range Status   01/23/2024 6.3 5.0 - 12.0 % Final     Eosinophil %   Date Value Ref Range Status   01/23/2024 1.1 0.3 - 6.2 % Final     Basophil %   Date Value Ref Range Status   01/23/2024 0.8 0.0 - 1.5 % Final     Immature Grans %   Date Value Ref Range Status   01/23/2024 0.1 0.0 - 0.5 % Final     Neutrophils, Absolute   Date Value Ref Range Status   01/23/2024 3.17 1.70 - 7.00 10*3/mm3 Final     Lymphocytes, Absolute   Date Value Ref Range Status   01/23/2024 3.32 (H) 0.70 - 3.10 10*3/mm3 Final     Monocytes, Absolute   Date Value Ref Range Status   01/23/2024 0.45 0.10 - 0.90 10*3/mm3 Final     Eosinophils, Absolute   Date Value Ref Range Status   01/23/2024 0.08 0.00 - 0.40 10*3/mm3 Final     Basophils, Absolute   Date Value Ref Range Status   01/23/2024 0.06 0.00 - 0.20 10*3/mm3 Final     Immature Grans, Absolute   Date Value Ref Range Status   01/23/2024 0.01 0.00 - 0.05 10*3/mm3 Final     nRBC   Date Value Ref Range Status   01/23/2024 0.0 0.0 - 0.2 /100 WBC Final         Lab Results   Component Value Date    GLUCOSE 89 01/23/2024    BUN 18 01/23/2024    CREATININE 0.80 01/23/2024    BCR 22.5 01/23/2024    K 4.2 01/23/2024    CO2 23.2 01/23/2024    CALCIUM 9.6 01/23/2024    ALBUMIN 4.5 01/23/2024    AST 15 01/23/2024    ALT 20 01/23/2024       Patient seen in no apparent distress.      PHYSICAL EXAM:     Foot/Ankle Exam    GENERAL  Appearance:  appears stated age  Orientation:  AAOx3  Affect:  appropriate  Gait:  unimpaired  Assistance:  independent  Right shoe gear: casual shoe  Left shoe gear: casual shoe    VASCULAR     Right Foot Vascularity   Normal vascular exam    Dorsalis pedis:  2+  Posterior  tibial:  2+  Skin temperature:  warm  Edema grading:  None  CFT:  < 3 seconds  Pedal hair growth:  Present  Varicosities:  none     Left Foot Vascularity   Normal vascular exam    Dorsalis pedis:  2+  Posterior tibial:  2+  Skin temperature:  warm  Edema grading:  None  CFT:  < 3 seconds  Pedal hair growth:  Present  Varicosities:  none     NEUROLOGIC     Right Foot Neurologic   Normal sensation    Light touch sensation: normal  Vibratory sensation: normal  Hot/Cold sensation: normal  Protective Sensation using Lismore-Christiano Monofilament:   Sites intact: 10  Sites tested: 10     Left Foot Neurologic   Normal sensation    Light touch sensation: normal  Vibratory sensation: normal  Hot/Cold sensation:  normal  Protective Sensation using Lismore-Christiano Monofilament:   Sites intact: 10  Sites tested: 10    MUSCULOSKELETAL     Right Foot Musculoskeletal   Tenderness:  (Tenderness to ATFL)    MUSCLE STRENGTH     Right Foot Muscle Strength   Foot dorsiflexion:  4  Foot plantar flexion:  4  Foot inversion:  4  Foot eversion:  4     Left Foot Muscle Strength   Foot dorsiflexion:  4  Foot plantar flexion:  4  Foot inversion:  4  Foot eversion:  4    RANGE OF MOTION     Right Foot Range of Motion   Foot and ankle ROM within normal limits       Left Foot Range of Motion   Foot and ankle ROM within normal limits      DERMATOLOGIC      Right Foot Dermatologic   Skin  Right foot skin is intact.      Left Foot Dermatologic   Skin  Left foot skin is intact.      Right foot additional comments: Mild subluxation to peroneals on range of motion.      RADIOLOGY:      No results found.    ASSESSMENT/PLAN     Diagnoses and all orders for this visit:    1. Ankle impingement syndrome, right (Primary)    2. Neuritis    3. Subluxation of peroneal tendon of right foot        Comprehensive lower extremity examination and evaluation was performed.    Discussed MRI findings with patient.  Patient with mild peroneal subluxation and pain to  anterior lateral ankle gutter.  Discussed with the patient ankle arthroscopy with ligament repair.      EMG/nerve conduction study to rule out peroneal injury.  Surgical intervention pending findings.  Continue physical therapy.    Discussed findings and treatment plan including risks, benefits, and treatment options with patient in detail. Patient agreed with treatment plan.    Medications and allergies reviewed.  Reviewed available lab values along with other pertinent labs.  These were discussed with the patient.    An After Visit Summary was printed and given to the patient at discharge, including (if requested) any available informative/educational handouts regarding diagnosis, treatment, or medications. All questions were answered to patient/family satisfaction. Should symptoms fail to improve or worsen they agree to call or return to clinic or to go to the Emergency Department. Discussed the importance of following up with any needed screening tests/labs/specialist appointments and any requested follow-up recommended by me today. Importance of maintaining follow-up discussed and patient accepts that missed appointments can delay diagnosis and potentially lead to worsening of conditions.    Return in about 1 month (around 11/21/2024)., or sooner if acute issues arise.    This document has been electronically signed by Dar Bautista DPM on October 21, 2024 14:01 EDT

## 2024-10-30 ENCOUNTER — HOSPITAL ENCOUNTER (OUTPATIENT)
Facility: HOSPITAL | Age: 44
Discharge: HOME | End: 2024-10-30
Admitting: PODIATRIST
Payer: OTHER MISCELLANEOUS

## 2024-10-30 DIAGNOSIS — M79.2 NEURITIS: ICD-10-CM

## 2024-10-30 PROCEDURE — 95910 NRV CNDJ TEST 7-8 STUDIES: CPT

## 2024-10-30 PROCEDURE — 95886 MUSC TEST DONE W/N TEST COMP: CPT

## 2024-11-06 ENCOUNTER — OFFICE VISIT (OUTPATIENT)
Dept: PODIATRY | Facility: CLINIC | Age: 44
End: 2024-11-06
Payer: OTHER MISCELLANEOUS

## 2024-11-06 VITALS
SYSTOLIC BLOOD PRESSURE: 119 MMHG | WEIGHT: 146 LBS | HEART RATE: 83 BPM | OXYGEN SATURATION: 94 % | DIASTOLIC BLOOD PRESSURE: 88 MMHG | TEMPERATURE: 98 F | BODY MASS INDEX: 24.3 KG/M2

## 2024-11-06 DIAGNOSIS — M25.871 ANKLE IMPINGEMENT SYNDROME, RIGHT: ICD-10-CM

## 2024-11-06 DIAGNOSIS — M79.2 NEURITIS: Primary | ICD-10-CM

## 2024-11-06 NOTE — PROGRESS NOTES
[facility] - PODIATRY    Today's Date: 11/06/24    Patient Name: Beatriz Larios  MRN: [id number]  CSN: [id number]  PCP: Pooja Haque APRN,  Referring Provider: No ref. provider found    SUBJECTIVE     Chief Complaint   Patient presents with    Right Ankle - Follow-up, Pain     Here for EMG results, she did 16 sessions of PT, walking without crutches now     HPI: Beatriz Larios, a 44 y.o.female, presents to clinic.    Patient is a 44-year-old female presenting with right ankle discomfort.  Patient states it hurts to walk and she feels like something is going on top of her ankle and around it when she is walking.  It can hurt up the outside of her calf with the feeling of coldness in her ankle.  She was previously treated for a medial malleoli are fracture.  Currently in physical therapy.    11/6/2024-patient has been doing physical therapy and has improved to wearing a tennis shoe.    Past Medical History:   Diagnosis Date    Arrhythmia     Essential hypertension 08/03/2021    Fracture, tibia and fibula 7/22/24    Hypertension     Palpitations     Paroxysmal SVT (supraventricular tachycardia) 08/03/2021     Past Surgical History:   Procedure Laterality Date    APPENDECTOMY      ENDOMETRIAL ABLATION       Family History   Problem Relation Age of Onset    Melanoma Father     Heart disease Father 50    Cancer Father         Melanomas    Coronary artery disease Paternal Grandfather      Social History     Socioeconomic History    Marital status:    Tobacco Use    Smoking status: Never    Smokeless tobacco: Never   Vaping Use    Vaping status: Never Used   Substance and Sexual Activity    Alcohol use: Not Currently     Alcohol/week: 7.0 standard drinks of alcohol    Drug use: Never    Sexual activity: Yes     Partners: Male     Birth control/protection: Vasectomy     Allergies   Allergen Reactions    Penicillins Hives     Current Outpatient Medications   Medication Sig Dispense Refill     atorvastatin (LIPITOR) 10 MG tablet Take 1 tablet by mouth Daily.      buPROPion XL (WELLBUTRIN XL) 300 MG 24 hr tablet Take 1 tablet by mouth Every Morning.      cetirizine (zyrTEC) 10 MG tablet Take 1 tablet by mouth Daily.      diclofenac (VOLTAREN) 75 MG EC tablet Take 1 tablet by mouth 2 (Two) Times a Day. 60 tablet 2    Erenumab-aooe (AIMOVIG) 70 MG/ML auto-injector Inject 1 mL under the skin into the appropriate area as directed.      Ibuprofen 3 %, Gabapentin 10 %, Baclofen 2 %, lidocaine 4 %, Ketamine HCl 4 % Apply 1-2 g topically to the appropriate area as directed 3 (Three) to 4 (Four) times daily. 90 g 1    levETIRAcetam XR (KEPPRA XR) 500 MG tablet Take 2 tablets by mouth Daily.      topiramate (TOPAMAX) 25 MG tablet Take 1 tablet by mouth Every 12 (Twelve) Hours.      valACYclovir (VALTREX) 1000 MG tablet Take 1 tablet by mouth.      valACYclovir (VALTREX) 1000 MG tablet As Needed. (Patient not taking: Reported on 11/6/2024)       No current facility-administered medications for this visit.     Review of Systems    OBJECTIVE     Vitals:    11/06/24 0930   BP: 119/88   Pulse: 83   Temp: 98 °F (36.7 °C)   SpO2: 94%       WBC   Date Value Ref Range Status   01/23/2024 7.09 3.40 - 10.80 10*3/mm3 Final     RBC   Date Value Ref Range Status   01/23/2024 4.34 3.77 - 5.28 10*6/mm3 Final     Hemoglobin   Date Value Ref Range Status   01/23/2024 13.5 12.0 - 15.9 g/dL Final     Hematocrit   Date Value Ref Range Status   01/23/2024 40.7 34.0 - 46.6 % Final     MCV   Date Value Ref Range Status   01/23/2024 93.8 79.0 - 97.0 fL Final     MCH   Date Value Ref Range Status   01/23/2024 31.1 26.6 - 33.0 pg Final     MCHC   Date Value Ref Range Status   01/23/2024 33.2 31.5 - 35.7 g/dL Final     RDW   Date Value Ref Range Status   01/23/2024 12.6 12.3 - 15.4 % Final     RDW-SD   Date Value Ref Range Status   01/23/2024 43.8 37.0 - 54.0 fl Final     MPV   Date Value Ref Range Status   01/23/2024 10.3 6.0 - 12.0 fL Final      Platelets   Date Value Ref Range Status   01/23/2024 282 140 - 450 10*3/mm3 Final     Neutrophil %   Date Value Ref Range Status   01/23/2024 44.9 42.7 - 76.0 % Final     Lymphocyte %   Date Value Ref Range Status   01/23/2024 46.8 (H) 19.6 - 45.3 % Final     Monocyte %   Date Value Ref Range Status   01/23/2024 6.3 5.0 - 12.0 % Final     Eosinophil %   Date Value Ref Range Status   01/23/2024 1.1 0.3 - 6.2 % Final     Basophil %   Date Value Ref Range Status   01/23/2024 0.8 0.0 - 1.5 % Final     Immature Grans %   Date Value Ref Range Status   01/23/2024 0.1 0.0 - 0.5 % Final     Neutrophils, Absolute   Date Value Ref Range Status   01/23/2024 3.17 1.70 - 7.00 10*3/mm3 Final     Lymphocytes, Absolute   Date Value Ref Range Status   01/23/2024 3.32 (H) 0.70 - 3.10 10*3/mm3 Final     Monocytes, Absolute   Date Value Ref Range Status   01/23/2024 0.45 0.10 - 0.90 10*3/mm3 Final     Eosinophils, Absolute   Date Value Ref Range Status   01/23/2024 0.08 0.00 - 0.40 10*3/mm3 Final     Basophils, Absolute   Date Value Ref Range Status   01/23/2024 0.06 0.00 - 0.20 10*3/mm3 Final     Immature Grans, Absolute   Date Value Ref Range Status   01/23/2024 0.01 0.00 - 0.05 10*3/mm3 Final     nRBC   Date Value Ref Range Status   01/23/2024 0.0 0.0 - 0.2 /100 WBC Final         Lab Results   Component Value Date    GLUCOSE 89 01/23/2024    BUN 18 01/23/2024    CREATININE 0.80 01/23/2024    BCR 22.5 01/23/2024    K 4.2 01/23/2024    CO2 23.2 01/23/2024    CALCIUM 9.6 01/23/2024    ALBUMIN 4.5 01/23/2024    AST 15 01/23/2024    ALT 20 01/23/2024       Patient seen in no apparent distress.      PHYSICAL EXAM:     Foot/Ankle Exam    GENERAL  Appearance:  appears stated age  Orientation:  AAOx3  Affect:  appropriate  Gait:  unimpaired  Assistance:  independent  Right shoe gear: casual shoe  Left shoe gear: casual shoe    VASCULAR     Right Foot Vascularity   Normal vascular exam    Dorsalis pedis:  2+  Posterior tibial:  2+  Skin  temperature:  warm  Edema grading:  None  CFT:  < 3 seconds  Pedal hair growth:  Present  Varicosities:  none     Left Foot Vascularity   Normal vascular exam    Dorsalis pedis:  2+  Posterior tibial:  2+  Skin temperature:  warm  Edema grading:  None  CFT:  < 3 seconds  Pedal hair growth:  Present  Varicosities:  none     NEUROLOGIC     Right Foot Neurologic   Normal sensation    Light touch sensation: normal  Vibratory sensation: normal  Hot/Cold sensation: normal  Protective Sensation using [place]-Christ[place] Monofilament:   Sites intact: 10  Sites tested: 10     Left Foot Neurologic   Normal sensation    Light touch sensation: normal  Vibratory sensation: normal  Hot/Cold sensation:  normal  Protective Sensation using [place]Christiano Monofilament:   Sites intact: 10  Sites tested: 10    MUSCULOSKELETAL     Right Foot Musculoskeletal   Tenderness:  (Tenderness to ATFL)    MUSCLE STRENGTH     Right Foot Muscle Strength   Foot dorsiflexion:  4  Foot plantar flexion:  4  Foot inversion:  4  Foot eversion:  4     Left Foot Muscle Strength   Foot dorsiflexion:  4  Foot plantar flexion:  4  Foot inversion:  4  Foot eversion:  4    RANGE OF MOTION     Right Foot Range of Motion   Foot and ankle ROM within normal limits       Left Foot Range of Motion   Foot and ankle ROM within normal limits      DERMATOLOGIC      Right Foot Dermatologic   Skin  Right foot skin is intact.      Left Foot Dermatologic   Skin  Left foot skin is intact.      Right foot additional comments: Stretching tape intact to right lower extremity      RADIOLOGY:      EMG & Nerve Conduction Test    Result Date: 11/1/2024  Narrative: See attached EMG/NCS report. Electronically signed by Sven Stone PT, 11/01/24, 10:06 AM EDT.      ASSESSMENT/PLAN     Diagnoses and all orders for this visit:    1. Neuritis (Primary)    2. Ankle impingement syndrome, right          Comprehensive lower extremity examination and evaluation was performed.    EMG nerve  conduction study reviewed with patient.  Patient improving with physical therapy.  Discussed continuing physical therapy for the next month.  Discussed surgical options if she fails to improve.    Return to clinic in 1 month    Discussed findings and treatment plan including risks, benefits, and treatment options with patient in detail. Patient agreed with treatment plan.    Medications and allergies reviewed.  Reviewed available lab values along with other pertinent labs.  These were discussed with the patient.    An After Visit Summary was printed and given to the patient at discharge, including (if requested) any available informative/educational handouts regarding diagnosis, treatment, or medications. All questions were answered to patient/family satisfaction. Should symptoms fail to improve or worsen they agree to call or return to clinic or to go to the Emergency Department. Discussed the importance of following up with any needed screening tests/labs/specialist appointments and any requested follow-up recommended by me today. Importance of maintaining follow-up discussed and patient accepts that missed appointments can delay diagnosis and potentially lead to worsening of conditions.    Return in about 1 month (around 12/6/2024)., or sooner if acute issues arise.    This document has been electronically signed by Dar Bautista DPM on November 6, 2024 14:10 EST

## 2024-11-26 ENCOUNTER — OFFICE VISIT (OUTPATIENT)
Dept: ORTHOPEDIC SURGERY | Facility: CLINIC | Age: 44
End: 2024-11-26
Payer: OTHER MISCELLANEOUS

## 2024-11-26 VITALS
HEART RATE: 91 BPM | OXYGEN SATURATION: 99 % | WEIGHT: 151.8 LBS | SYSTOLIC BLOOD PRESSURE: 139 MMHG | DIASTOLIC BLOOD PRESSURE: 95 MMHG | HEIGHT: 66 IN | BODY MASS INDEX: 24.4 KG/M2

## 2024-11-26 DIAGNOSIS — M75.01 ADHESIVE CAPSULITIS OF RIGHT SHOULDER: ICD-10-CM

## 2024-11-26 DIAGNOSIS — M25.511 RIGHT SHOULDER PAIN, UNSPECIFIED CHRONICITY: Primary | ICD-10-CM

## 2024-11-26 RX ADMIN — LIDOCAINE HYDROCHLORIDE 5 ML: 10 INJECTION, SOLUTION INFILTRATION; PERINEURAL at 10:17

## 2024-11-26 RX ADMIN — TRIAMCINOLONE ACETONIDE 40 MG: 40 INJECTION, SUSPENSION INTRA-ARTICULAR; INTRAMUSCULAR at 10:17

## 2024-11-26 NOTE — PROGRESS NOTES
"Chief Complaint  Initial Evaluation of the Right Shoulder     Subjective      Beatriz Larios presents to [facility] for initial evaluation of the right shoulder.  She had broke her ankle in July and was using crutches for a long time.  She has been able to walk with out the crutches for about a month.  She notes the pain is progressively getting worse.  She has difficulty with forward and upward ROM.  She notes catching and feels like it is going out of place.      Allergies   Allergen Reactions    Penicillins Hives        Social History     Socioeconomic History    Marital status:    Tobacco Use    Smoking status: Never    Smokeless tobacco: Never   Vaping Use    Vaping status: Never Used   Substance and Sexual Activity    Alcohol use: Not Currently     Alcohol/week: 7.0 standard drinks of alcohol    Drug use: Never    Sexual activity: Yes     Partners: Male     Birth control/protection: Vasectomy        I reviewed the patient's chief complaint, history of present illness, review of systems, past medical history, surgical history, family history, social history, medications, and allergy list.     Review of Systems     Constitutional: Denies fevers, chills, weight loss  Cardiovascular: Denies chest pain, shortness of breath  Skin: Denies rashes, acute skin changes  Neurologic: Denies headache, loss of consciousness        Vital Signs:   /95   Pulse 91   Ht 167.6 cm (66\")   Wt 68.9 kg (151 lb 12.8 oz)   SpO2 99%   BMI 24.50 kg/m²          Physical Exam  General: Alert. No acute distress    Ortho Exam        RIGHT SHOULDER Forward flexion 120. Abduction 80. External rotation 10. Internal rotation SI joint. Positive Cross body adduction. Supraspinatus strength 4+/5. Infraspinatus Strength 5/5. Infrared subscap 5/5. Positive Taylor. Positive Neer. Negative Apprehension. Negative Lift off. (Negative Obriens. Sensation intact to light touch, median, radial, ulnar nerve. " Positive AIN, PIN, ulnar nerve motor. Positive pulses. Positive Impingement signs. Good strength in triceps, biceps, deltoid, wrist extensors and wrist flexors. Tender to palpation to the anterior aspect of the shoulder and down the arm.         Right shoulder: R subacromial bursa  Date/Time: 11/26/2024 10:17 AM  Consent given by: patient  Site marked: site marked  Timeout: Immediately prior to procedure a time out was called to verify the correct patient, procedure, equipment, support staff and site/side marked as required   Supporting Documentation  Indications: pain   Procedure Details  Location: shoulder - R subacromial bursa  Needle gauge: 21 G.  Medications administered: 5 mL lidocaine 1 %; 40 mg triamcinolone acetonide 40 MG/ML  Patient tolerance: patient tolerated the procedure well with no immediate complications      This injection documentation was Scribed for Brianna Diaz MD by Viry Montemayor.  11/26/24   10:17 EST        Imaging Results (Most Recent)       Procedure Component Value Units Date/Time    XR Scapula Right [[id number]] Resulted: 11/26/24 0929     Updated: 11/26/24 0930             Result Review :     X-Ray Report:  Right scapula X-Ray  Indication: Evaluation of the right scapula  AP/Lateral view(s)  Findings: Mild AC joint arthritis.   Prior studies available for comparison: no       EMG & Nerve Conduction Test    Result Date: 11/1/2024  Narrative: See attached EMG/NCS report. Electronically signed by Sven Stone PT, 11/01/24, 10:06 AM EDT.             Assessment and Plan     Diagnoses and all orders for this visit:    1. Right shoulder pain, unspecified chronicity (Primary)  -     XR Scapula Right    2. Adhesive capsulitis of right shoulder        Discussed the treatment plan with the patient. I reviewed the X-rays that were obtained today with the patient.     Discussed the risks and benefits of conservative measures. The patient expressed understanding and wished to proceed with a  right shoulder steroid injection.  She tolerated the injection well.     Prescribed physical therapy.  HEP exercises.  Continue anti inflammatory.     Call or return if worsening symptoms.    Follow Up     4-6 weeks to assess ROM of the shoulder. Will discuss MRI of the right shoulder if the injection and therapy isn't helpful.       Patient was given instructions and counseling regarding her condition or for health maintenance advice. Please see specific information pulled into the AVS if appropriate.     Scribed for Brianna Diaz MD by Diane Carty MA.  11/26/24   09:36 EST    I have personally performed the services described in this document as scribed by the above individual and it is both accurate and complete. Brianna Daiz MD 11/27/24

## 2024-11-27 RX ORDER — LIDOCAINE HYDROCHLORIDE 10 MG/ML
5 INJECTION, SOLUTION INFILTRATION; PERINEURAL
Status: COMPLETED | OUTPATIENT
Start: 2024-11-26 | End: 2024-11-26

## 2024-11-27 RX ORDER — TRIAMCINOLONE ACETONIDE 40 MG/ML
40 INJECTION, SUSPENSION INTRA-ARTICULAR; INTRAMUSCULAR
Status: COMPLETED | OUTPATIENT
Start: 2024-11-26 | End: 2024-11-26

## 2024-12-04 ENCOUNTER — OFFICE VISIT (OUTPATIENT)
Dept: PODIATRY | Facility: CLINIC | Age: 44
End: 2024-12-04
Payer: OTHER MISCELLANEOUS

## 2024-12-04 VITALS
SYSTOLIC BLOOD PRESSURE: 131 MMHG | DIASTOLIC BLOOD PRESSURE: 89 MMHG | HEART RATE: 94 BPM | HEIGHT: 66 IN | BODY MASS INDEX: 24.27 KG/M2 | WEIGHT: 151 LBS | OXYGEN SATURATION: 99 %

## 2024-12-04 DIAGNOSIS — M25.871 ANKLE IMPINGEMENT SYNDROME, RIGHT: ICD-10-CM

## 2024-12-04 DIAGNOSIS — M79.2 NEURITIS: Primary | ICD-10-CM

## 2024-12-06 NOTE — PROGRESS NOTES
[facility] - PODIATRY    Today's Date: 12/06/24    Patient Name: Beatriz Larios  MRN: [id number]  CSN: [id number]  PCP: Pooja Haque APRN,  Referring Provider: No ref. provider found    SUBJECTIVE     Chief Complaint   Patient presents with    Right Ankle - Follow-up, Work Related Injury     Ankle impingement syndrome  Neuritis  Feeling much better with PT, just stiffness     HPI: Beatriz Larios, a 44 y.o.female, presents to clinic.    Patient is a 44-year-old female presenting with right ankle discomfort.  Patient states it hurts to walk and she feels like something is going on top of her ankle and around it when she is walking.  It can hurt up the outside of her calf with the feeling of coldness in her ankle.  She was previously treated for a medial malleoli are fracture.  Currently in physical therapy.    11/6/2024-patient has been doing physical therapy and has improved to wearing a tennis shoe.    12/4/2024-patient has been doing physical therapy and her ankle has improved tremendously.    Past Medical History:   Diagnosis Date    Arrhythmia     Difficulty walking 7/22/24    Rt tibia fracture w/atfl tear    Essential hypertension 08/03/2021    Fracture, tibia and fibula 7/22/24    History of transfusion 12/19/2001    Hypertension     Palpitations     Paroxysmal SVT (supraventricular tachycardia) 08/03/2021    Seizures 1/27/24     Past Surgical History:   Procedure Laterality Date    APPENDECTOMY      ENDOMETRIAL ABLATION       Family History   Problem Relation Age of Onset    Melanoma Father     Heart disease Father 50    Cancer Father         Melanomas    Coronary artery disease Paternal Grandfather      Social History     Socioeconomic History    Marital status:    Tobacco Use    Smoking status: Never    Smokeless tobacco: Never   Vaping Use    Vaping status: Never Used   Substance and Sexual Activity    Alcohol use: Not Currently     Alcohol/week: 7.0 standard drinks of alcohol  Ambika de la oficina de husain pediatra te llamara para hacer riya francois. Si no llaman en 24 horas, por favor llama la oficina para pedir riya francois con husain pediatra. Es importante que husain pediatra evalue Ignacia en los proximo 3 maalgon.     Nosotros te vamos a llamar si that cultura de kavon cresca riya bacteria, por favor contesta numberos que no conoscas estos proximos malagon por que puede ser el hospital llamandote con estos resultados.         Enfermedad del coronavirus 2019 (COVID-19): ¿cómo cuidarse y cuidar a otros?  Si usted o algún miembro del núcleo familiar tiene síntomas de COVID-19, siga las siguientes pautas para prevenir la propagación del virus y controlar los síntomas.  ¿Qué hacer si piensa que tiene los síntomas de la COVID-19?  · Quédese en husain casa. Llame a husain proveedor de atención médica y dígale que tiene los síntomas de la COVID-19. [place]mer esto antes de ir al hospital o a la clínica. Siga las instrucciones de husain proveedor. Es posible que le aconsejen que se aísle en husain casa. A esto se lo llama autoaislamiento. Es posible que también le indiquen que mantenga riya distancia de, al menos, 2 metros (6 pies) de otras personas para prevenir la propagación de la COVID-19. [place] se conoce rosario “distanciamiento social”.  · No vaya al trabajo, a la escuela ni a lugares públicos. Limite el contacto físico con haley familiares. Limite las visitas. No bese a nadie ni comparta utensilios para comer o beber. Limpie las superficies que toque con un desinfectante. Eso es para ayudar a prevenir que el virus se propague.  · Si necesita toser o estornudar, hágalo en un pañuelo desechable. Luego arroje el pañuelo desechable a la basura. Si no tiene un pañuelo, tosa o estornude en el pliegue del codo.  · Use barbijos de lisa con dos o más capas de lisa lavable y transpirable en lugares públicos o al estar en espacios cerrados con personas que no viven con usted. También puede usar riya mascarilla de papel descartable debajo del barbijo de     Drug use: Never    Sexual activity: Yes     Partners: Male     Birth control/protection: Vasectomy     Allergies   Allergen Reactions    Penicillins Hives     Current Outpatient Medications   Medication Sig Dispense Refill    atorvastatin (LIPITOR) 10 MG tablet Take 1 tablet by mouth Daily.      buPROPion XL (WELLBUTRIN XL) 300 MG 24 hr tablet Take 1 tablet by mouth Every Morning.      cetirizine (zyrTEC) 10 MG tablet Take 1 tablet by mouth Daily.      diclofenac (VOLTAREN) 75 MG EC tablet Take 1 tablet by mouth 2 (Two) Times a Day. 60 tablet 2    Erenumab-aooe (AIMOVIG) 70 MG/ML auto-injector Inject 1 mL under the skin into the appropriate area as directed.      Ibuprofen 3 %, Gabapentin 10 %, Baclofen 2 %, lidocaine 4 %, Ketamine HCl 4 % Apply 1-2 g topically to the appropriate area as directed 3 (Three) to 4 (Four) times daily. 90 g 1    levETIRAcetam XR (KEPPRA XR) 500 MG tablet Take 2 tablets by mouth Daily.      topiramate (TOPAMAX) 25 MG tablet Take 1 tablet by mouth Every 12 (Twelve) Hours.      valACYclovir (VALTREX) 1000 MG tablet Take 1 tablet by mouth.      valACYclovir (VALTREX) 1000 MG tablet As Needed. (Patient not taking: Reported on 10/21/2024)       No current facility-administered medications for this visit.     Review of Systems    OBJECTIVE     Vitals:    12/04/24 0929   BP: 131/89   Pulse: 94   SpO2: 99%       WBC   Date Value Ref Range Status   01/23/2024 7.09 3.40 - 10.80 10*3/mm3 Final     RBC   Date Value Ref Range Status   01/23/2024 4.34 3.77 - 5.28 10*6/mm3 Final     Hemoglobin   Date Value Ref Range Status   01/23/2024 13.5 12.0 - 15.9 g/dL Final     Hematocrit   Date Value Ref Range Status   01/23/2024 40.7 34.0 - 46.6 % Final     MCV   Date Value Ref Range Status   01/23/2024 93.8 79.0 - 97.0 fL Final     MCH   Date Value Ref Range Status   01/23/2024 31.1 26.6 - 33.0 pg Final     MCHC   Date Value Ref Range Status   01/23/2024 33.2 31.5 - 35.7 g/dL Final     RDW   Date Value Ref  lisa. husain entorno. Eso incluye elementos rosario lisa. [place] es para proteger a  Range Status   01/23/2024 12.6 12.3 - 15.4 % Final     RDW-SD   Date Value Ref Range Status   01/23/2024 43.8 37.0 - 54.0 fl Final     MPV   Date Value Ref Range Status   01/23/2024 10.3 6.0 - 12.0 fL Final     Platelets   Date Value Ref Range Status   01/23/2024 282 140 - 450 10*3/mm3 Final     Neutrophil %   Date Value Ref Range Status   01/23/2024 44.9 42.7 - 76.0 % Final     Lymphocyte %   Date Value Ref Range Status   01/23/2024 46.8 (H) 19.6 - 45.3 % Final     Monocyte %   Date Value Ref Range Status   01/23/2024 6.3 5.0 - 12.0 % Final     Eosinophil %   Date Value Ref Range Status   01/23/2024 1.1 0.3 - 6.2 % Final     Basophil %   Date Value Ref Range Status   01/23/2024 0.8 0.0 - 1.5 % Final     Immature Grans %   Date Value Ref Range Status   01/23/2024 0.1 0.0 - 0.5 % Final     Neutrophils, Absolute   Date Value Ref Range Status   01/23/2024 3.17 1.70 - 7.00 10*3/mm3 Final     Lymphocytes, Absolute   Date Value Ref Range Status   01/23/2024 3.32 (H) 0.70 - 3.10 10*3/mm3 Final     Monocytes, Absolute   Date Value Ref Range Status   01/23/2024 0.45 0.10 - 0.90 10*3/mm3 Final     Eosinophils, Absolute   Date Value Ref Range Status   01/23/2024 0.08 0.00 - 0.40 10*3/mm3 Final     Basophils, Absolute   Date Value Ref Range Status   01/23/2024 0.06 0.00 - 0.20 10*3/mm3 Final     Immature Grans, Absolute   Date Value Ref Range Status   01/23/2024 0.01 0.00 - 0.05 10*3/mm3 Final     nRBC   Date Value Ref Range Status   01/23/2024 0.0 0.0 - 0.2 /100 WBC Final         Lab Results   Component Value Date    GLUCOSE 89 01/23/2024    BUN 18 01/23/2024    CREATININE 0.80 01/23/2024    BCR 22.5 01/23/2024    K 4.2 01/23/2024    CO2 23.2 01/23/2024    CALCIUM 9.6 01/23/2024    ALBUMIN 4.5 01/23/2024    AST 15 01/23/2024    ALT 20 01/23/2024       Patient seen in no apparent distress.      PHYSICAL EXAM:     Foot/Ankle Exam    GENERAL  Appearance:  appears stated age  Orientation:  AAOx3  Affect:  appropriate  Gait:   otras personas de los microbios. Si no le es posible usar barbijo, quienes lo cuidan deberían hacerlo. Usted mismo puede confeccionar un barbijo de lisa. Los CDC cuentan con instrucciones sobre cómo hacer un barbijo. Use el barbijo de forma mi que cubra la nariz y la boca.  · Manténgase alejado de las demás personas en husain casa.  · Evite el contacto con mascotas y con otros animales.  · No comparta comida ni artículos personales con gente de husain entorno. Eso incluye elementos rosario utensilios para comer y beber, toallas y ropa de cama.  · Si necesita toser o estornudar, hágalo en un pañuelo desechable. Luego arroje el pañuelo desechable a la basura. Si no tiene un pañuelo, tosa o estornude en el pliegue del codo.  · Lávese las arely con frecuencia.    Cuidados personales en husain casa   La Administración de Alimentos y Medicamentos (FDA, por husain sigla en [facility] in the [place]) aprobó vacunas para prevenir la COVID-19 en personas mayores de 18 años (por ahora, se aprobó solo riya vacuna para jóvenes mayores de 16 años). Las embarazadas o las personas en período de lactancia pueden elegir si quieren recibir la vacuna. Los grupos de expertos, incluidos el [employer] (ACOG, por husain sigla en [facility]) y los CDC, aconsejan que las personas embarazadas que quieran aplicarse la vacuna consulten sangeeta con husain proveedor de atención médica acerca de la vacunación.  Las vacunas se están implementando en fases. Consulte en el departamento de melvina local los planes de implementación en husain comunidad. Las vacunas se administran mediante inyección en el músculo del brazo. Es posible que le apliquen riya vacuna de riya dosis o de dos dosis. Si le aplican la vacuna de dos dosis, la segunda dosis se administra varias semanas después de la primera.  Actualmente, el tratamiento consiste en ayudar al cuerpo mientras combate el virus. Amado es lo que se conoce rosario tratamiento de apoyo. Si la enfermedad es grave, quizás deba  unimpaired  Assistance:  independent  Right shoe gear: casual shoe  Left shoe gear: casual shoe    VASCULAR     Right Foot Vascularity   Normal vascular exam    Dorsalis pedis:  2+  Posterior tibial:  2+  Skin temperature:  warm  Edema grading:  None  CFT:  < 3 seconds  Pedal hair growth:  Present  Varicosities:  none     Left Foot Vascularity   Normal vascular exam    Dorsalis pedis:  2+  Posterior tibial:  2+  Skin temperature:  warm  Edema grading:  None  CFT:  < 3 seconds  Pedal hair growth:  Present  Varicosities:  none     NEUROLOGIC     Right Foot Neurologic   Normal sensation    Light touch sensation: normal  Vibratory sensation: normal  Hot/Cold sensation: normal  Protective Sensation using Bendena-Christiano Monofilament:   Sites intact: 10  Sites tested: 10     Left Foot Neurologic   Normal sensation    Light touch sensation: normal  Vibratory sensation: normal  Hot/Cold sensation:  normal  Protective Sensation using Bendena-Christiano Monofilament:   Sites intact: 10  Sites tested: 10    MUSCULOSKELETAL     Right Foot Musculoskeletal   Tenderness:  anterior talofibular ligament tenderness      MUSCLE STRENGTH     Right Foot Muscle Strength   Foot dorsiflexion:  4  Foot plantar flexion:  4  Foot inversion:  4  Foot eversion:  4     Left Foot Muscle Strength   Foot dorsiflexion:  4  Foot plantar flexion:  4  Foot inversion:  4  Foot eversion:  4    RANGE OF MOTION     Right Foot Range of Motion   Foot and ankle ROM within normal limits       Left Foot Range of Motion   Foot and ankle ROM within normal limits      DERMATOLOGIC      Right Foot Dermatologic   Skin  Right foot skin is intact.      Left Foot Dermatologic   Skin  Left foot skin is intact.       RADIOLOGY:      XR Scapula Right    Result Date: 11/26/2024  Narrative: X-Ray Report: Right scapula X-Ray Indication: Evaluation of the right scapula AP/Lateral view(s) Findings: Mild AC joint arthritis. Prior studies available for comparison: no       ASSESSMENT/PLAN     Diagnoses and all orders for this visit:    1. Neuritis (Primary)    2. Ankle impingement syndrome, right          Comprehensive lower extremity examination and evaluation was performed.    Return to clinic in 1-2 months or as needed    Continue physical therapy as needed, patient significantly improved since last visit.    Discussed findings and treatment plan including risks, benefits, and treatment options with patient in detail. Patient agreed with treatment plan.    Medications and allergies reviewed.  Reviewed available lab values along with other pertinent labs.  These were discussed with the patient.    An After Visit Summary was printed and given to the patient at discharge, including (if requested) any available informative/educational handouts regarding diagnosis, treatment, or medications. All questions were answered to patient/family satisfaction. Should symptoms fail to improve or worsen they agree to call or return to clinic or to go to the Emergency Department. Discussed the importance of following up with any needed screening tests/labs/specialist appointments and any requested follow-up recommended by me today. Importance of maintaining follow-up discussed and patient accepts that missed appointments can delay diagnosis and potentially lead to worsening of conditions.    Return in about 3 months (around 3/4/2025), or if symptoms worsen or fail to improve., or sooner if acute issues arise.    This document has been electronically signed by Dar Bautista DPM on December 6, 2024 12:15 EST         personas y mascotas alejadas de la persona enferma.    ¿Cuándo puede suspender el autoaislamiento?  Cuando está enfermo de la COVID-19, debe mantenerse alejado de otras personas. A esto se lo llama autoaislamiento.  Tea restricciones son diferentes si tuvo COVID-19 en los últimos 3 meses, delmi está completamente recuperado, sin síntomas, y estuvo expuesto a riya persona con COVID-19. Si no presenta síntomas, no tiene que quedarse en casa asilado de otras personas ni volver a hacerse la prueba. Los [facility] no recomiendan repetir la prueba a menos que presente síntomas de COVID-19 y estos síntomas nuevos no puedan vincularse a otra enfermedad. Comuníquese con husain proveedor de atención médica si tiene alguna pregunta. Si comienza a presentar síntomas, quédese en casa. Si tuvo COVID-19 hace más de 3 meses y volvió a estar expuesto, trate el anne rosario si nunca hubiese tenido COVID-19 y quédese en casa, limite el contacto con los demás, llame a husain proveedor y esté atento a los síntomas.  Si husain estado de melv[place] es normal, desde los CDC se aconseja no repetir el análisis de COVID-19 por medio de un hisopado nasofaríngeo. Puede suspender el autoaislamiento cuando se cumplan las 3 condiciones siguientes:  1. No ha tenido fiebre en las últimas 24 horas. Es decir que no tiene fiebre sin skyler usado medicamentos para bajarla, rosario el paracetamol, juju al menos 24 horas.  2. Se aliviaron los síntomas, rosario la tos y la dificultad para respirar.  3. Transcurrieron al menos 10 días desde que los primeros síntomas comenzaron.  Consulte a husain proveedor de atención médica antes de salir husain casa. Coméntele si las 3 condiciones antes mencionadas se cumplen. Podría decirle que es aceptable salir de husain casa. En algunos casos, el estado donde vive o husain área local pueden tener algún consejo específico. Husain proveedor de atención médica le dará más información sobre esto.   Si tiene un sistema inmunitario debilitado y tiene COVID-19, o si tuvo un  cuadro grave de COVID-19,  las instrucciones sobre cuándo suspender el aislamiento serán algo diferentes. Algunas afecciones y tratamientos pueden debilitar el sistema inmunitario, rosario el tratamiento contra el cáncer, los trasplantes de órganos o de médula y las afecciones rosario el VIH u otros trastornos que afectan al sistema inmunitario. Es posible que le aconsejen permanecer en husain casa entre 10 y 20 días después de la aparición de los síntomas. Puede que husain proveedor de atención médica quiera volver a realizarle la prueba de detección de COVID-19. Siga las instrucciones de husain proveedor.  El regreso a los lugares públicos  Cuando se sienta lo suficientemente deshawn rosario para salir de husain hogar, tenga en cuenta las recomendaciones de los CDC sobre el uso de barbijos de lisa:     · Los CDC aconsejan el uso de barbijos de dos o más capas de lisa en lugares públicos a toda persona mayor de 2 años que se encuentre rodeada de personas que no pertenezcan al núcleo familiar, en especial, cuando sea difícil mantener el distanciamiento físico. Por ejemplo, debe usar barbijo en lugares públicos rosario el transporte público, las marchas y las manifestaciones públicas, y en comercios, bares y restaurantes donde haya aglomeración de gente. También es recomendable el uso de barbijo en espacios cerrados con personas que no viven con usted.  · El uso de barbijos puede ayudar a evitar que las personas con COVID-19 propaguen el virus a otros.  · Mediante el uso de barbijos, hay riya mayor probabilidad de reducir la propagación de la COVID-19 si la mayoría de las personas los utiliza en público.     Determinadas personas no deberían usar barbijo. Por ejemplo:  · Niños menores de 2 años.  · Toda persona con alguna afección médica, del desarrollo o mental que pueda empeorar con el uso del barbijo.  · Toda persona que se encuentre inconsciente o incapacitada para quitarse el barbijo por haley propios medios. Pan las recomendaciones de los CDC  2021 © 2000-2019 [address] [address]. All rights reserved. This information is not intended as a substitute for professional medical care. Always follow your healthcare professional's instructions. This information has been modified by your health care provider with permission from the publisher.

## 2024-12-26 ENCOUNTER — OFFICE VISIT (OUTPATIENT)
Dept: ORTHOPEDIC SURGERY | Facility: CLINIC | Age: 44
End: 2024-12-26
Payer: OTHER MISCELLANEOUS

## 2024-12-26 VITALS
HEART RATE: 85 BPM | SYSTOLIC BLOOD PRESSURE: 138 MMHG | OXYGEN SATURATION: 100 % | BODY MASS INDEX: 24.27 KG/M2 | HEIGHT: 66 IN | DIASTOLIC BLOOD PRESSURE: 91 MMHG | WEIGHT: 151.01 LBS

## 2024-12-26 DIAGNOSIS — M25.511 ACUTE PAIN OF RIGHT SHOULDER: Primary | ICD-10-CM

## 2024-12-26 RX ORDER — MELOXICAM 15 MG/1
15 TABLET ORAL DAILY
Qty: 30 TABLET | Refills: 2 | Status: SHIPPED | OUTPATIENT
Start: 2024-12-26

## 2024-12-26 NOTE — PATIENT INSTRUCTIONS
Discussed treatment plan of care with patient.    Advised patient to continue physical therapy.  Order was updated today.  Continue home exercises as well.    Discussed with patient changing her diclofenac to a different anti-inflammatory and she would like to try this.  Mobic was sent to pharmacy.    Advised on 3-month durations between injections.    Discussed an MRI to evaluate structures and patient would like to proceed.  Order was placed today.    Patient to follow-up after MRI.  Call for questions, concerns or worsening symptoms.

## 2024-12-26 NOTE — PROGRESS NOTES
"Chief Complaint  Pain and Follow-up of the Right Shoulder    Subjective      Beatriz Larios presents to [facility] for follow-up of the right shoulder.  To review, patient had an ankle injury in July and was using crutches for quite some time.  She began having pain in the right shoulder about a month after her injury and it has progressively gotten worse.  She is in physical therapy with [facility] and reports that they are very limited on what they can do due to pain.  Her pain varies from a 1 at rest to a 10 with motion.  She is currently taking diclofenac and has been for quite some time.  She had a steroid injection in the shoulder on 11/26/2024 and did not notice any difference with that.  She is currently taking diclofenac and is unsure if it is helping.    Objective   Allergies   Allergen Reactions    Penicillins Hives       Vital Signs:   /91   Pulse 85   Ht 167.6 cm (66\")   Wt 68.5 kg (151 lb 0.2 oz)   SpO2 100%   BMI 24.37 kg/m²       Physical Exam    Constitutional: Awake, alert. Well nourished appearance.    Integumentary: Warm, dry, intact. No obvious rashes.    HENT: Atraumatic, normocephalic.   Respiratory: Non labored respirations .   Cardiovascular: Intact peripheral pulses.    Psychiatric: Normal mood and affect. A&O X3    Ortho Exam  Right shoulder: Tender to palpation on the anterolateral shoulder.  Active range of motion for shoulder flexion 120, passive motion 120, active motion abduction 85, passive 100.  Internal rotation to her side, external rotation 40 degrees.  Good motion of the elbow and the wrist.  Positive impingement sign.  Neurovascular intact.  Sensation is intact.  Pain with upper limits of motion.    Imaging Results (Most Recent)       None                      Assessment and Plan   Problem List Items Addressed This Visit    None  Visit Diagnoses       Acute pain of right shoulder    -  Primary    Relevant Orders    MRI Shoulder Right Without " Contrast    Ambulatory Referral to Physical Therapy for Evaluation & Treatment (Completed)            Follow Up   Return for Recheck.    Patient is a non-smoker, did not discuss options for smoking cessation.     Social History     Socioeconomic History    Marital status:    Tobacco Use    Smoking status: Never    Smokeless tobacco: Never   Vaping Use    Vaping status: Never Used   Substance and Sexual Activity    Alcohol use: Not Currently     Alcohol/week: 7.0 standard drinks of alcohol    Drug use: Never    Sexual activity: Yes     Partners: Male     Birth control/protection: Vasectomy       Patient Instructions   Discussed treatment plan of care with patient.    Advised patient to continue physical therapy.  Order was updated today.  Continue home exercises as well.    Discussed with patient changing her diclofenac to a different anti-inflammatory and she would like to try this.  Mobic was sent to pharmacy.    Advised on 3-month durations between injections.    Discussed an MRI to evaluate structures and patient would like to proceed.  Order was placed today.    Patient to follow-up after MRI.  Call for questions, concerns or worsening symptoms.  Patient was given instructions and counseling regarding her condition or for health maintenance advice. Please see specific information pulled into the AVS if appropriate.

## 2025-01-03 ENCOUNTER — TELEPHONE (OUTPATIENT)
Dept: ORTHOPEDIC SURGERY | Facility: CLINIC | Age: 45
End: 2025-01-03
Payer: OTHER MISCELLANEOUS

## 2025-01-03 NOTE — TELEPHONE ENCOUNTER
Caller: YURIY     Relationship to Patient: [facility]     Phone Number: [phone]    Reason for Call:   [facility] IS NEEDING WORKERS COMPENSATION APPROVAL,PLEASE SEND ORDER TO  SO THEY CAN APPROVE IMAGING REQUEST AND SEND TO [facility].

## 2025-01-09 ENCOUNTER — HOSPITAL ENCOUNTER (OUTPATIENT)
Dept: OTHER | Facility: HOSPITAL | Age: 45
Discharge: HOME | End: 2025-01-09

## 2025-01-10 DIAGNOSIS — M25.511 ACUTE PAIN OF RIGHT SHOULDER: ICD-10-CM

## 2025-01-14 ENCOUNTER — OFFICE VISIT (OUTPATIENT)
Dept: ORTHOPEDIC SURGERY | Facility: CLINIC | Age: 45
End: 2025-01-14
Payer: OTHER MISCELLANEOUS

## 2025-01-14 ENCOUNTER — PREP FOR SURGERY (OUTPATIENT)
Dept: OTHER | Facility: HOSPITAL | Age: 45
End: 2025-01-14
Payer: OTHER MISCELLANEOUS

## 2025-01-14 VITALS
WEIGHT: 150 LBS | OXYGEN SATURATION: 98 % | BODY MASS INDEX: 24.11 KG/M2 | HEART RATE: 96 BPM | HEIGHT: 66 IN | DIASTOLIC BLOOD PRESSURE: 93 MMHG | SYSTOLIC BLOOD PRESSURE: 155 MMHG

## 2025-01-14 DIAGNOSIS — M75.01 ADHESIVE CAPSULITIS OF RIGHT SHOULDER: Primary | ICD-10-CM

## 2025-01-14 DIAGNOSIS — M75.01 ADHESIVE CAPSULITIS OF RIGHT SHOULDER: ICD-10-CM

## 2025-01-14 DIAGNOSIS — M25.511 ACUTE PAIN OF RIGHT SHOULDER: ICD-10-CM

## 2025-01-14 DIAGNOSIS — M25.511 RIGHT SHOULDER PAIN, UNSPECIFIED CHRONICITY: Primary | ICD-10-CM

## 2025-01-14 RX ORDER — CLINDAMYCIN PHOSPHATE 900 MG/50ML
900 INJECTION, SOLUTION INTRAVENOUS ONCE
OUTPATIENT
Start: 2025-01-14 | End: 2025-01-14

## 2025-01-14 NOTE — PROGRESS NOTES
"Chief Complaint  Follow-up and Pain of the Right Scapula (Mri - [facility])    Subjective      Beatriz Larios presents to [facility] for follow up of her right shoulder.    To review, patient injured her ankle in July required her to use crutches.  Since then she had been developing right shoulder pain.  She went to physical therapy to help with the shoulder pain but was significantly limited secondary to the pain.  Patient has anti-inflammatories that she takes intermittently for the pain.  Patient had a steroid injection in the right shoulder on 11/26/2024 but did not have any improvement.  During her last office visit on 12/26/2024 she received an MRI order for the shoulder.  She is here today to review the MRI.    Allergies   Allergen Reactions    Penicillins Hives       Objective     Vital Signs:   Vitals:    01/14/25 1510   BP: 155/93   Pulse: 96   SpO2: 98%   Weight: 68 kg (150 lb)   Height: 167.6 cm (66\")     Body mass index is 24.21 kg/m².    I reviewed the patient's chief complaint, history of present illness, review of systems, past medical history, surgical history, family history, social history, medications, and allergy list.     Ortho Exam    General: Alert. No acute distress.  Right upper Extremity: tender to palpation. No skin discoloration, atrophy, or swelling. 120 degrees active elevation.  Passive forward shoulder elevation 140 degrees.  External rotation to 30 degrees. Internal rotation to back pocket. Demonstrates intact active elbow ROM. Demonstrates intact active wrist ROM. Sensation intact. Palpable radial pulse. Neurovascularly intact.       MRI of the right shoulder performed at [facility] on 1/9/2025.  Impression:  1.  Supraspinatus tendinosis with bursal surface partial-thickness tear involving nearly 50% deficit the tendon.  2.  Abnormal thickening and edema of the joint capsule findings could indicate adhesive capsulitis in the proper clinical " setting.  3.  Acromioclavicular osteoarthrosis with inferior acromial spurring.  Please correlate for symptoms of impingement.       Assessment and Plan   Diagnoses and all orders for this visit:    1. Right shoulder pain, unspecified chronicity (Primary)    2. Adhesive capsulitis of right shoulder       Beatriz Larios presents to [facility] for her right shoulder.  MRI was reviewed.  Patient has adhesive capsulitis.  Discussed treatment options for this which includes rigorous physical therapy and repeat steroid injection to help with pain and inflammation.  Home exercises were also provided and further information.    We also discussed other treatment options for adhesive capsulitis which include right shoulder manipulation.  Patient was seen at [facility] for approximately 9 visits regarding her shoulder without significant improvement in her pain and range of motion.    Patient elects to proceed with right shoulder manipulation. Discussed surgery with the patient. Risks/benefits discussed with patient including, but not limited to: infection, bleeding, neurovascular damage, malunion, nonunion, aesthetic deformity, need for further surgery, and death.  Patient understands and desires to proceed. Surgery pamphlet provided to patient. Patient is to meet with our surgery scheduler at check out and proceed with scheduling of surgery.  Discussed that she will enter physical therapy daily for 2 weeks following the manipulation.  Discussed the physical therapy protocol postmanipulation.  Patient verbalized understanding.    All questions and concerns were addressed and answered. Patient left the office without any additional questions.     Tobacco Use: Low Risk  (1/14/2025)    Patient History     Smoking Tobacco Use: Never     Smokeless Tobacco Use: Never     Passive Exposure: Not on file     Patient reports that they are a nonsmoker; cessation education not applicable.     BMI is within normal  parameters. No other follow-up for BMI required.      Follow Up   Return for Postmanipulation.  There are no Patient Instructions on file for this visit.    Patient was given instructions and counseling regarding her condition or for health maintenance advice. Please see specific information pulled into the AVS if appropriate.       Dictated Utilizing Dragon Dictation. Please note that portions of this note were completed with a voice recognition program. Part of this note may be an electronic transcription/translation of spoken language to printed text using the Dragon Dictation System.

## 2025-01-16 ENCOUNTER — TELEPHONE (OUTPATIENT)
Dept: ORTHOPEDIC SURGERY | Facility: CLINIC | Age: 45
End: 2025-01-16
Payer: OTHER MISCELLANEOUS

## 2025-01-16 NOTE — TELEPHONE ENCOUNTER
PATIENT CALLED ASKING WHEN REQUEST FOR AUTH WILL BE SENT TO WORKERS COMP- INFORMED PATIENT [facility] WILL SEND ABOUT TEN DAYS OUT FROM SURGERY DATE- PATIENT ACKNOWLEDGED  UNDERSTANDING

## 2025-01-28 ENCOUNTER — TELEPHONE (OUTPATIENT)
Dept: ORTHOPEDIC SURGERY | Facility: CLINIC | Age: 45
End: 2025-01-28
Payer: OTHER MISCELLANEOUS

## 2025-01-28 NOTE — TELEPHONE ENCOUNTER
PER [facility] - RIGHT SHOULDER MANIPULATION IS NOT APPROVED AS THEY STATE THE MRI FINDINGS WERE FOUND NOT RELATED TO THE WORK INCIDENT. SX FOR 1/29/2025 HAS BEEN CANCELED AND AN APPT TO SEE [facility] HAS BEEN SCHEDULED TO DISCUSS OTHER OPTIONS.

## 2025-02-04 ENCOUNTER — PREP FOR SURGERY (OUTPATIENT)
Dept: OTHER | Facility: HOSPITAL | Age: 45
End: 2025-02-04
Payer: COMMERCIAL

## 2025-02-04 ENCOUNTER — OFFICE VISIT (OUTPATIENT)
Dept: ORTHOPEDIC SURGERY | Facility: CLINIC | Age: 45
End: 2025-02-04
Payer: COMMERCIAL

## 2025-02-04 VITALS
SYSTOLIC BLOOD PRESSURE: 131 MMHG | HEART RATE: 90 BPM | HEIGHT: 66 IN | OXYGEN SATURATION: 98 % | BODY MASS INDEX: 24.11 KG/M2 | DIASTOLIC BLOOD PRESSURE: 91 MMHG | WEIGHT: 150 LBS

## 2025-02-04 DIAGNOSIS — M25.511 RIGHT SHOULDER PAIN, UNSPECIFIED CHRONICITY: ICD-10-CM

## 2025-02-04 DIAGNOSIS — M75.111 INCOMPLETE TEAR OF RIGHT ROTATOR CUFF, UNSPECIFIED WHETHER TRAUMATIC: ICD-10-CM

## 2025-02-04 DIAGNOSIS — M75.01 ADHESIVE CAPSULITIS OF RIGHT SHOULDER: Primary | ICD-10-CM

## 2025-02-04 DIAGNOSIS — M75.01 ADHESIVE CAPSULITIS OF RIGHT SHOULDER: ICD-10-CM

## 2025-02-04 DIAGNOSIS — M25.511 ACUTE PAIN OF RIGHT SHOULDER: Primary | ICD-10-CM

## 2025-02-04 RX ORDER — CLINDAMYCIN PHOSPHATE 900 MG/50ML
900 INJECTION, SOLUTION INTRAVENOUS ONCE
OUTPATIENT
Start: 2025-02-04 | End: 2025-02-04

## 2025-02-05 NOTE — H&P (VIEW-ONLY)
"Chief Complaint  Pain and Follow-up of the Right Shoulder    Subjective      Beatriz Larios presents to [facility] for follow up of their right shoulder.    To review, patient injured her ankle in July required her to use crutches. Since then she had been developing right shoulder pain. She went to physical therapy to help with the shoulder pain but was significantly limited secondary to the pain. Patient has anti-inflammatories that she takes intermittently for the pain. Patient had a steroid injection in the right shoulder on 11/26/2024 but did not have any improvement. During her last office visit on 12/26/2024 she received an MRI order for the shoulder.     Patient had findings of a partial-thickness rotator cuff surface tear as well as that he was capsulitis.  Patient had plan to proceed with right shoulder manipulation however Worker's Comp. denied her request.  She is returning today stating that she has been able to go back to physical therapy or have her manipulation therefore her pain is severe.  She states that she has decreased in her range of motion and she is only able to tolerate so much secondary to pain.    She returns clinic today stating that if she is not able to get any further with Worker's Comp. she is wanting to proceed with submitting for the case request through her private insurance.  She states that the pain is severe and she needs some pain relief.  She would like to consider the arthroscopy surgical intervention since she does have a partial rotator cuff tear.    Allergies   Allergen Reactions    Penicillins Hives       Objective     Vital Signs:   Vitals:    02/04/25 1510   BP: 131/91   Pulse: 90   SpO2: 98%   Weight: 68 kg (150 lb)   Height: 167.6 cm (66\")     Body mass index is 24.21 kg/m².    I reviewed the patient's chief complaint, history of present illness, review of systems, past medical history, surgical history, family history, social history, " medications, and allergy list.     Ortho Exam  Shoulder   General: Alert. No acute distress.  Right upper Extremity: tender to palpation. No skin discoloration, atrophy, or swelling. 90 degrees active elevation. 120 degrees passive forward shoulder elevation.  External rotation to 30 degrees. Internal rotation to side pocket. Demonstrates intact active elbow ROM. Demonstrates intact active wrist ROM. Sensation intact. Palpable radial pulse. Neurovascularly intact.            MRI of the right shoulder performed on 1/9/2025 at [facility].  Findings:  Acromioclavicular joint: Anatomically aligned but narrowed with mild bony spurring and capsular hypertrophy.  There is spurring from the undersurface of the acromion.  Very subtle lateral acromial downsloping.  Type II acromion.  Rotator cuff: Intermediate high signal associated with the footprint of the supraspinatus with what appears to be a bursal surface partial tear.  This tear measures up to 4 to 5 mm involves up to 50% depth of the tendon.  This affects mainly the conjoined segment of the supraspinatus.  Rotator cuff is otherwise intact.  Long head of the biceps tendon: Properly positioned in the bicipital groove with normal attachment to the bicipital anchor.  Glenohumeral joint: Anatomically aligned.  Cartilage surfaces appear maintained.  Capsular ligaments: There is abnormal thickening and edema of the joint capsule including rotator cuff interval soft tissues.    Impression:  1.  Supraspinatus tendinosis with bursal surface partial-thickness tear involving nearly 50% the depth of the tendon.  2.  Abnormal thickening and edema of the joint capsule findings of which could indicate adhesive capsulitis in the proper clinical setting.  3.  Acromioclavicular osteoarthrosis with inferior acromial spurring.           Assessment and Plan   Diagnoses and all orders for this visit:    1. Adhesive capsulitis of right shoulder (Primary)  -     Ambulatory Referral to  Physical Therapy for Evaluation & Treatment    2. Right shoulder pain, unspecified chronicity  -     Ambulatory Referral to Physical Therapy for Evaluation & Treatment    3. Incomplete tear of right rotator cuff, unspecified whether traumatic  -     Ambulatory Referral to Physical Therapy for Evaluation & Treatment         Beatriz Larios presents today to [facility] for the follow up of their right shoulder.  Patient has right shoulder adhesive capsulitis and a partial-thickness rotator cuff tear that she has tried to manage conservatively physical therapy.  Patient has not had any improvement in physical therapy and therefore elected to proceed with surgical intervention.  Right shoulder arthroscopy was discussed. Discussed surgery with the patient.  Discussed that her symptoms may be just related to the adhesive capsulitis and therefore the manipulation may be all she needs however patient states she only wants to have 1 surgical procedure and wants to proceed with arthroscopy.  There will still be the requirement of her attending physical therapy postoperatively and stressed the importance of consistency with that.  Patient verbalized understanding.    Risks/benefits discussed with patient including, but not limited to: infection, bleeding, neurovascular damage, malunion, nonunion, aesthetic deformity, need for further surgery, and death.  Patient understands and desires to proceed. Surgery pamphlet provided to patient. Patient is to meet with our surgery scheduler at check out and proceed with scheduling of surgery.     All questions and concerns were addressed and answered. Patient left the office without any additional questions.       Tobacco Use: Low Risk  (2/4/2025)    Patient History     Smoking Tobacco Use: Never     Smokeless Tobacco Use: Never     Passive Exposure: Not on file     Patient reports that they are a nonsmoker; cessation education not applicable.     BMI is within normal parameters. No  other follow-up for BMI required.      Follow Up   No follow-ups on file.  There are no Patient Instructions on file for this visit.    Patient was given instructions and counseling regarding her condition or for health maintenance advice. Please see specific information pulled into the AVS if appropriate.     Dictated Utilizing Dragon Dictation. Please note that portions of this note were completed with a voice recognition program. Part of this note may be an electronic transcription/translation of spoken language to printed text using the Dragon Dictation System.

## 2025-02-05 NOTE — PROGRESS NOTES
"Chief Complaint  Pain and Follow-up of the Right Shoulder    Subjective      Beatriz Larios presents to [facility] for follow up of their right shoulder.    To review, patient injured her ankle in July required her to use crutches. Since then she had been developing right shoulder pain. She went to physical therapy to help with the shoulder pain but was significantly limited secondary to the pain. Patient has anti-inflammatories that she takes intermittently for the pain. Patient had a steroid injection in the right shoulder on 11/26/2024 but did not have any improvement. During her last office visit on 12/26/2024 she received an MRI order for the shoulder.     Patient had findings of a partial-thickness rotator cuff surface tear as well as that he was capsulitis.  Patient had plan to proceed with right shoulder manipulation however Worker's Comp. denied her request.  She is returning today stating that she has been able to go back to physical therapy or have her manipulation therefore her pain is severe.  She states that she has decreased in her range of motion and she is only able to tolerate so much secondary to pain.    She returns clinic today stating that if she is not able to get any further with Worker's Comp. she is wanting to proceed with submitting for the case request through her private insurance.  She states that the pain is severe and she needs some pain relief.  She would like to consider the arthroscopy surgical intervention since she does have a partial rotator cuff tear.    Allergies   Allergen Reactions    Penicillins Hives       Objective     Vital Signs:   Vitals:    02/04/25 1510   BP: 131/91   Pulse: 90   SpO2: 98%   Weight: 68 kg (150 lb)   Height: 167.6 cm (66\")     Body mass index is 24.21 kg/m².    I reviewed the patient's chief complaint, history of present illness, review of systems, past medical history, surgical history, family history, social history, " medications, and allergy list.     Ortho Exam  Shoulder   General: Alert. No acute distress.  Right upper Extremity: tender to palpation. No skin discoloration, atrophy, or swelling. 90 degrees active elevation. 120 degrees passive forward shoulder elevation.  External rotation to 30 degrees. Internal rotation to side pocket. Demonstrates intact active elbow ROM. Demonstrates intact active wrist ROM. Sensation intact. Palpable radial pulse. Neurovascularly intact.            MRI of the right shoulder performed on 1/9/2025 at [facility].  Findings:  Acromioclavicular joint: Anatomically aligned but narrowed with mild bony spurring and capsular hypertrophy.  There is spurring from the undersurface of the acromion.  Very subtle lateral acromial downsloping.  Type II acromion.  Rotator cuff: Intermediate high signal associated with the footprint of the supraspinatus with what appears to be a bursal surface partial tear.  This tear measures up to 4 to 5 mm involves up to 50% depth of the tendon.  This affects mainly the conjoined segment of the supraspinatus.  Rotator cuff is otherwise intact.  Long head of the biceps tendon: Properly positioned in the bicipital groove with normal attachment to the bicipital anchor.  Glenohumeral joint: Anatomically aligned.  Cartilage surfaces appear maintained.  Capsular ligaments: There is abnormal thickening and edema of the joint capsule including rotator cuff interval soft tissues.    Impression:  1.  Supraspinatus tendinosis with bursal surface partial-thickness tear involving nearly 50% the depth of the tendon.  2.  Abnormal thickening and edema of the joint capsule findings of which could indicate adhesive capsulitis in the proper clinical setting.  3.  Acromioclavicular osteoarthrosis with inferior acromial spurring.           Assessment and Plan   Diagnoses and all orders for this visit:    1. Adhesive capsulitis of right shoulder (Primary)  -     Ambulatory Referral to  Physical Therapy for Evaluation & Treatment    2. Right shoulder pain, unspecified chronicity  -     Ambulatory Referral to Physical Therapy for Evaluation & Treatment    3. Incomplete tear of right rotator cuff, unspecified whether traumatic  -     Ambulatory Referral to Physical Therapy for Evaluation & Treatment         Beatriz Larios presents today to [facility] for the follow up of their right shoulder.  Patient has right shoulder adhesive capsulitis and a partial-thickness rotator cuff tear that she has tried to manage conservatively physical therapy.  Patient has not had any improvement in physical therapy and therefore elected to proceed with surgical intervention.  Right shoulder arthroscopy was discussed. Discussed surgery with the patient.  Discussed that her symptoms may be just related to the adhesive capsulitis and therefore the manipulation may be all she needs however patient states she only wants to have 1 surgical procedure and wants to proceed with arthroscopy.  There will still be the requirement of her attending physical therapy postoperatively and stressed the importance of consistency with that.  Patient verbalized understanding.    Risks/benefits discussed with patient including, but not limited to: infection, bleeding, neurovascular damage, malunion, nonunion, aesthetic deformity, need for further surgery, and death.  Patient understands and desires to proceed. Surgery pamphlet provided to patient. Patient is to meet with our surgery scheduler at check out and proceed with scheduling of surgery.     All questions and concerns were addressed and answered. Patient left the office without any additional questions.       Tobacco Use: Low Risk  (2/4/2025)    Patient History     Smoking Tobacco Use: Never     Smokeless Tobacco Use: Never     Passive Exposure: Not on file     Patient reports that they are a nonsmoker; cessation education not applicable.     BMI is within normal parameters. No  other follow-up for BMI required.      Follow Up   No follow-ups on file.  There are no Patient Instructions on file for this visit.    Patient was given instructions and counseling regarding her condition or for health maintenance advice. Please see specific information pulled into the AVS if appropriate.     Dictated Utilizing Dragon Dictation. Please note that portions of this note were completed with a voice recognition program. Part of this note may be an electronic transcription/translation of spoken language to printed text using the Dragon Dictation System.

## 2025-02-25 RX ORDER — MULTIPLE VITAMINS W/ MINERALS TAB 9MG-400MCG
1 TAB ORAL DAILY
COMMUNITY

## 2025-02-26 ENCOUNTER — ANESTHESIA EVENT (OUTPATIENT)
Dept: PERIOP | Facility: HOSPITAL | Age: 45
End: 2025-02-26
Payer: COMMERCIAL

## 2025-02-26 ENCOUNTER — ANESTHESIA (OUTPATIENT)
Dept: PERIOP | Facility: HOSPITAL | Age: 45
End: 2025-02-26
Payer: COMMERCIAL

## 2025-02-26 ENCOUNTER — HOSPITAL ENCOUNTER (OUTPATIENT)
Facility: HOSPITAL | Age: 45
Setting detail: HOSPITAL OUTPATIENT SURGERY
Discharge: HOME | End: 2025-02-26
Attending: ORTHOPAEDIC SURGERY | Admitting: ORTHOPAEDIC SURGERY
Payer: COMMERCIAL

## 2025-02-26 ENCOUNTER — ANESTHESIA EVENT CONVERTED (OUTPATIENT)
Dept: ANESTHESIOLOGY | Facility: HOSPITAL | Age: 45
End: 2025-02-26
Payer: COMMERCIAL

## 2025-02-26 VITALS
RESPIRATION RATE: 14 BRPM | TEMPERATURE: 96.5 F | DIASTOLIC BLOOD PRESSURE: 81 MMHG | BODY MASS INDEX: 25.62 KG/M2 | SYSTOLIC BLOOD PRESSURE: 137 MMHG | HEART RATE: 78 BPM | HEIGHT: 66 IN | OXYGEN SATURATION: 98 % | WEIGHT: 159.39 LBS

## 2025-02-26 DIAGNOSIS — M25.511 RIGHT SHOULDER PAIN, UNSPECIFIED CHRONICITY: ICD-10-CM

## 2025-02-26 DIAGNOSIS — M75.111 INCOMPLETE TEAR OF RIGHT ROTATOR CUFF, UNSPECIFIED WHETHER TRAUMATIC: ICD-10-CM

## 2025-02-26 DIAGNOSIS — M75.01 ADHESIVE CAPSULITIS OF RIGHT SHOULDER: ICD-10-CM

## 2025-02-26 LAB — B-HCG UR QL: NEGATIVE

## 2025-02-26 PROCEDURE — 25010000002 CLINDAMYCIN 900 MG/50ML SOLUTION

## 2025-02-26 PROCEDURE — 25010000002 ONDANSETRON PER 1 MG

## 2025-02-26 PROCEDURE — 25010000002 FENTANYL CITRATE (PF) 50 MCG/ML SOLUTION: Performed by: ANESTHESIOLOGY

## 2025-02-26 PROCEDURE — L3670 SO ACRO/CLAV CAN WEB PRE OTS: HCPCS | Performed by: ORTHOPAEDIC SURGERY

## 2025-02-26 PROCEDURE — 25010000002 SUGAMMADEX 200 MG/2ML SOLUTION

## 2025-02-26 PROCEDURE — 25810000003 LACTATED RINGERS PER 1000 ML: Performed by: ANESTHESIOLOGY

## 2025-02-26 PROCEDURE — 81025 URINE PREGNANCY TEST: CPT | Performed by: ANESTHESIOLOGY

## 2025-02-26 PROCEDURE — 25010000002 LIDOCAINE PF 2% 2 % SOLUTION

## 2025-02-26 PROCEDURE — 25010000002 DEXAMETHASONE PER 1 MG: Performed by: ANESTHESIOLOGY

## 2025-02-26 PROCEDURE — 25010000002 PROPOFOL 10 MG/ML EMULSION

## 2025-02-26 PROCEDURE — 25010000002 MIDAZOLAM PER 1MG: Performed by: ANESTHESIOLOGY

## 2025-02-26 RX ORDER — HYDROCODONE BITARTRATE AND ACETAMINOPHEN 7.5; 325 MG/1; MG/1
1 TABLET ORAL ONCE AS NEEDED
Status: DISCONTINUED | OUTPATIENT
Start: 2025-02-26 | End: 2025-02-26 | Stop reason: HOSPADM

## 2025-02-26 RX ORDER — ACETAMINOPHEN 500 MG
1000 TABLET ORAL ONCE
Status: COMPLETED | OUTPATIENT
Start: 2025-02-26 | End: 2025-02-26

## 2025-02-26 RX ORDER — DEXAMETHASONE SODIUM PHOSPHATE 4 MG/ML
INJECTION, SOLUTION INTRA-ARTICULAR; INTRALESIONAL; INTRAMUSCULAR; INTRAVENOUS; SOFT TISSUE
Status: COMPLETED
Start: 2025-02-26 | End: 2025-02-26

## 2025-02-26 RX ORDER — LIDOCAINE HYDROCHLORIDE 20 MG/ML
INJECTION, SOLUTION EPIDURAL; INFILTRATION; INTRACAUDAL; PERINEURAL AS NEEDED
Status: DISCONTINUED | OUTPATIENT
Start: 2025-02-26 | End: 2025-02-26 | Stop reason: SURG

## 2025-02-26 RX ORDER — HYDROCODONE BITARTRATE AND ACETAMINOPHEN 7.5; 325 MG/1; MG/1
1-2 TABLET ORAL EVERY 4 HOURS PRN
Qty: 40 TABLET | Refills: 0 | Status: SHIPPED | OUTPATIENT
Start: 2025-02-26

## 2025-02-26 RX ORDER — ONDANSETRON 2 MG/ML
INJECTION INTRAMUSCULAR; INTRAVENOUS AS NEEDED
Status: DISCONTINUED | OUTPATIENT
Start: 2025-02-26 | End: 2025-02-26 | Stop reason: SURG

## 2025-02-26 RX ORDER — MIDAZOLAM HYDROCHLORIDE 2 MG/2ML
2 INJECTION, SOLUTION INTRAMUSCULAR; INTRAVENOUS ONCE
Status: COMPLETED | OUTPATIENT
Start: 2025-02-26 | End: 2025-02-26

## 2025-02-26 RX ORDER — PROMETHAZINE HYDROCHLORIDE 25 MG/1
25 SUPPOSITORY RECTAL ONCE AS NEEDED
Status: DISCONTINUED | OUTPATIENT
Start: 2025-02-26 | End: 2025-02-26 | Stop reason: HOSPADM

## 2025-02-26 RX ORDER — DEXAMETHASONE SODIUM PHOSPHATE 4 MG/ML
INJECTION, SOLUTION INTRA-ARTICULAR; INTRALESIONAL; INTRAMUSCULAR; INTRAVENOUS; SOFT TISSUE
Status: COMPLETED | OUTPATIENT
Start: 2025-02-26 | End: 2025-02-26

## 2025-02-26 RX ORDER — BUPIVACAINE HYDROCHLORIDE AND EPINEPHRINE 5; 5 MG/ML; UG/ML
INJECTION, SOLUTION EPIDURAL; INTRACAUDAL; PERINEURAL
Status: COMPLETED
Start: 2025-02-26 | End: 2025-02-26

## 2025-02-26 RX ORDER — ROCURONIUM BROMIDE 10 MG/ML
INJECTION, SOLUTION INTRAVENOUS AS NEEDED
Status: DISCONTINUED | OUTPATIENT
Start: 2025-02-26 | End: 2025-02-26 | Stop reason: SURG

## 2025-02-26 RX ORDER — KETAMINE HYDROCHLORIDE 10 MG/ML
INJECTION, SOLUTION INTRAMUSCULAR; INTRAVENOUS AS NEEDED
Status: DISCONTINUED | OUTPATIENT
Start: 2025-02-26 | End: 2025-02-26 | Stop reason: SURG

## 2025-02-26 RX ORDER — BUPIVACAINE HYDROCHLORIDE AND EPINEPHRINE 5; 5 MG/ML; UG/ML
INJECTION, SOLUTION EPIDURAL; INTRACAUDAL; PERINEURAL
Status: COMPLETED | OUTPATIENT
Start: 2025-02-26 | End: 2025-02-26

## 2025-02-26 RX ORDER — SCOPOLAMINE 1 MG/3D
1 PATCH, EXTENDED RELEASE TRANSDERMAL ONCE
Status: DISCONTINUED | OUTPATIENT
Start: 2025-02-26 | End: 2025-02-26 | Stop reason: HOSPADM

## 2025-02-26 RX ORDER — PROMETHAZINE HYDROCHLORIDE 12.5 MG/1
25 TABLET ORAL ONCE AS NEEDED
Status: DISCONTINUED | OUTPATIENT
Start: 2025-02-26 | End: 2025-02-26 | Stop reason: HOSPADM

## 2025-02-26 RX ORDER — CLINDAMYCIN PHOSPHATE 900 MG/50ML
900 INJECTION, SOLUTION INTRAVENOUS ONCE
Status: COMPLETED | OUTPATIENT
Start: 2025-02-26 | End: 2025-02-26

## 2025-02-26 RX ORDER — SODIUM CHLORIDE, SODIUM LACTATE, POTASSIUM CHLORIDE, CALCIUM CHLORIDE 600; 310; 30; 20 MG/100ML; MG/100ML; MG/100ML; MG/100ML
9 INJECTION, SOLUTION INTRAVENOUS CONTINUOUS PRN
Status: DISCONTINUED | OUTPATIENT
Start: 2025-02-26 | End: 2025-02-26 | Stop reason: HOSPADM

## 2025-02-26 RX ORDER — PROPOFOL 10 MG/ML
VIAL (ML) INTRAVENOUS AS NEEDED
Status: DISCONTINUED | OUTPATIENT
Start: 2025-02-26 | End: 2025-02-26 | Stop reason: SURG

## 2025-02-26 RX ORDER — OXYCODONE HYDROCHLORIDE 5 MG/1
5 TABLET ORAL
Status: DISCONTINUED | OUTPATIENT
Start: 2025-02-26 | End: 2025-02-26 | Stop reason: HOSPADM

## 2025-02-26 RX ORDER — FENTANYL CITRATE 50 UG/ML
INJECTION, SOLUTION INTRAMUSCULAR; INTRAVENOUS AS NEEDED
Status: DISCONTINUED | OUTPATIENT
Start: 2025-02-26 | End: 2025-02-26 | Stop reason: SURG

## 2025-02-26 RX ORDER — ONDANSETRON 2 MG/ML
4 INJECTION INTRAMUSCULAR; INTRAVENOUS ONCE AS NEEDED
Status: DISCONTINUED | OUTPATIENT
Start: 2025-02-26 | End: 2025-02-26 | Stop reason: HOSPADM

## 2025-02-26 RX ORDER — FENTANYL CITRATE 50 UG/ML
INJECTION, SOLUTION INTRAMUSCULAR; INTRAVENOUS
Status: COMPLETED
Start: 2025-02-26 | End: 2025-02-26

## 2025-02-26 RX ADMIN — FENTANYL CITRATE 50 MCG: 50 INJECTION, SOLUTION INTRAMUSCULAR; INTRAVENOUS at 11:02

## 2025-02-26 RX ADMIN — FENTANYL CITRATE 100 MCG: 50 INJECTION, SOLUTION INTRAMUSCULAR; INTRAVENOUS at 10:21

## 2025-02-26 RX ADMIN — KETAMINE HYDROCHLORIDE 25 MG: 10 INJECTION INTRAMUSCULAR; INTRAVENOUS at 11:30

## 2025-02-26 RX ADMIN — DEXAMETHASONE SODIUM PHOSPHATE 4 MG: 4 INJECTION, SOLUTION INTRAMUSCULAR; INTRAVENOUS at 11:07

## 2025-02-26 RX ADMIN — BUPIVACAINE HYDROCHLORIDE AND EPINEPHRINE BITARTRATE 30 ML: 5; .005 INJECTION, SOLUTION EPIDURAL; INTRACAUDAL; PERINEURAL at 10:26

## 2025-02-26 RX ADMIN — ACETAMINOPHEN 1000 MG: 500 TABLET ORAL at 10:01

## 2025-02-26 RX ADMIN — CLINDAMYCIN PHOSPHATE 900 MG: 900 INJECTION, SOLUTION INTRAVENOUS at 11:07

## 2025-02-26 RX ADMIN — PROPOFOL 175 MCG/KG/MIN: 10 INJECTION, EMULSION INTRAVENOUS at 11:04

## 2025-02-26 RX ADMIN — MIDAZOLAM HYDROCHLORIDE 2 MG: 1 INJECTION, SOLUTION INTRAMUSCULAR; INTRAVENOUS at 10:21

## 2025-02-26 RX ADMIN — ROCURONIUM BROMIDE 10 MG: 50 INJECTION INTRAVENOUS at 11:48

## 2025-02-26 RX ADMIN — ROCURONIUM BROMIDE 40 MG: 50 INJECTION INTRAVENOUS at 11:02

## 2025-02-26 RX ADMIN — PROPOFOL 150 MG: 10 INJECTION, EMULSION INTRAVENOUS at 11:02

## 2025-02-26 RX ADMIN — LIDOCAINE HYDROCHLORIDE 80 MG: 20 INJECTION, SOLUTION EPIDURAL; INFILTRATION; INTRACAUDAL; PERINEURAL at 11:02

## 2025-02-26 RX ADMIN — SODIUM CHLORIDE, SODIUM LACTATE, POTASSIUM CHLORIDE, CALCIUM CHLORIDE 9 ML/HR: 20; 30; 600; 310 INJECTION, SOLUTION INTRAVENOUS at 10:01

## 2025-02-26 RX ADMIN — SUGAMMADEX 200 MG: 100 INJECTION, SOLUTION INTRAVENOUS at 12:00

## 2025-02-26 RX ADMIN — SCOLOPAMINE TRANSDERMAL SYSTEM 1 PATCH: 1 PATCH, EXTENDED RELEASE TRANSDERMAL at 10:00

## 2025-02-26 RX ADMIN — FENTANYL CITRATE 50 MCG: 50 INJECTION, SOLUTION INTRAMUSCULAR; INTRAVENOUS at 11:19

## 2025-02-26 RX ADMIN — ONDANSETRON 4 MG: 2 INJECTION INTRAMUSCULAR; INTRAVENOUS at 11:56

## 2025-02-26 RX ADMIN — DEXAMETHASONE SODIUM PHOSPHATE 4 MG: 4 INJECTION, SOLUTION INTRAMUSCULAR; INTRAVENOUS at 10:26

## 2025-02-26 RX ADMIN — SODIUM CHLORIDE, SODIUM LACTATE, POTASSIUM CHLORIDE, CALCIUM CHLORIDE: 20; 30; 600; 310 INJECTION, SOLUTION INTRAVENOUS at 11:34

## 2025-02-26 NOTE — ANESTHESIA PREPROCEDURE EVALUATION
Anesthesia Evaluation     Patient summary reviewed and Nursing notes reviewed   history of anesthetic complications:  PONV  NPO Solid Status: > 8 hours  NPO Liquid Status: > 2 hours           Airway   Mallampati: I  TM distance: >3 FB  Neck ROM: full  No difficulty expected  Dental - normal exam     Pulmonary - negative pulmonary ROS and normal exam    breath sounds clear to auscultation  Cardiovascular - normal exam  Exercise tolerance: good (4-7 METS)    Rhythm: regular  Rate: normal    (+) hypertension, hyperlipidemia      Neuro/Psych  (+) seizures (1/2024; keppra) well controlled, headaches  GI/Hepatic/Renal/Endo - negative ROS     Musculoskeletal     Abdominal  - normal exam   Substance History - negative use     OB/GYN negative ob/gyn ROS         Other   arthritis,     ROS/Med Hx Other: PAT Nursing Notes unavailable.                     Anesthesia Plan    ASA 2     general and regional     (Patient understands anesthesia not responsible for dental damage. Regional anesthesia options discussed with patient. Pt accepts regional block.)  intravenous induction     Anesthetic plan, risks, benefits, and alternatives have been provided, discussed and informed consent has been obtained with: patient.    Use of blood products discussed with patient .    Plan discussed with CRNA.        CODE STATUS:

## 2025-02-26 NOTE — ANESTHESIA PROCEDURE NOTES
Peripheral Block    Pre-sedation assessment completed: 2/26/2025 10:18 AM    Patient reassessed immediately prior to procedure    Patient location during procedure: pre-op  Start time: 2/26/2025 10:21 AM  Stop time: 2/26/2025 10:26 AM  Reason for block: at surgeon's request and post-op pain management  Preanesthetic Checklist  Completed: patient identified, IV checked, site marked, risks and benefits discussed, surgical consent, monitors and equipment checked, pre-op evaluation and timeout performed  Prep:  Pt Position: supine (HOB elevated)  Sterile barriers:cap, washed/disinfected hands, sterile barriers, gloves, mask, partial drape and alcohol skin prep  Prep: ChloraPrep  Patient monitoring: blood pressure monitoring, continuous pulse oximetry and EKG  Procedure    Sedation: yes  Performed under: local infiltration  Guidance:ultrasound guided and nerve stimulator    ULTRASOUND INTERPRETATION.  Using ultrasound guidance a 22 G gauge needle was placed in close proximity to the brachial plexus nerve, at which point, under ultrasound guidance anesthetic was injected in the area of the nerve and spread of the anesthesia was seen on ultrasound in close proximity thereto.  There were no abnormalities seen on ultrasound; a digital image was taken; and the patient tolerated the procedure with no complications. Images:still images obtained, printed/placed on chart    Laterality:right  Block Type:interscalene  Injection Technique:single-shot  Needle Type:echogenic  Needle Gauge:22 G (2in)  Resistance on Injection: none    Medications Used: dexamethasone (DECADRON) injection 4 mg/mL - Injection   4 mg - 2/26/2025 10:26:00 AM  bupivacaine-EPINEPHrine PF (MARCAINE w/EPI) 0.5% -[id number] injection - Injection   30 mL - 2/26/2025 10:26:00 AM      Post Assessment  Injection Assessment: negative aspiration for heme, no paresthesia on injection and incremental injection  Patient Tolerance:comfortable throughout  block  Complications:no  Additional Notes  The block or continuous infusion is requested by the referring physician for management of postoperative pain, or pain related to a procedure. Ultrasound guidance (deemed medically necessary). Painless injection, pt was awake and conversant during the procedure without complications. Needle and surrounding structures visualized throughout procedure. No adverse reactions or complications seen during this period. Post-procedure image showed no signs of complication, and anatomy was consistent with an uncomplicated nerve blockade.  Performed by: Erika Hill MD

## 2025-02-26 NOTE — OP NOTE
SHOULDER ARTHROSCOPY WITH ROTATOR CUFF REPAIR, SHOULDER MANIPULATION  Procedure Report    Patient Name:  Beatriz Larios  YOB: 1980    Date of Surgery:  2/26/2025     Indications:  shoulder pain, failed conservative care    Pre-op Diagnosis:   Adhesive capsulitis of right shoulder [M75.01]  Incomplete tear of right rotator cuff, unspecified whether traumatic [M75.111]  Right shoulder pain, unspecified chronicity [M25.511] with shoulder impingement and AC joint spurring and hypertrophy       Post-Op Diagnosis Codes:     * Adhesive capsulitis of right shoulder [M75.01]     * Incomplete tear of right rotator cuff, unspecified whether traumatic [M75.111]     * Right shoulder pain, unspecified chronicity [M25.511] with shoulder impingement and AC joint spurring and hypertrophy    Procedure/CPT® Codes:  KS SURGICAL ARTHROSCOPY SHOULDER W/ROTATOR CUFF RPR [68839]  KS MNPJ W/ANES SHOULDER JT APPL FIXATION APPARATUS [25427]    Procedure(s):  Right SHOULDER ARTHROSCOPY WITH with arthroscopic extensive subacromial decompression, distal clavicle resection.  Right shoulder manipulation under general anesthesia     Staff:  Surgeon(s):  Brianna Diaz MD    Assistant: Wallace Gaona    Anesthesia: General with Block    Estimated Blood Loss: 25 mL    Implants:    Nothing was implanted during the procedure    Specimen:          None        Findings: no full rtc tear, positive spurring/bursitis     Complications: none    Description of Procedure: The patient was brought to the operating room and had a preoperative antibiotic and preoperative block. The patient was placed in a modified beach chair position and was prepped and draped in sterile fashion.  A shoulder manipulation was performed with marked improvements of range of motion A posterior portal was made. The scope was inserted to the glenohumeral joint. There was no labral tear, no biceps tendon tear, and there was no full-thickness rotator cuff tear. Attention  was then turned to the subacromial space where an extensive subacromial decompression was then performed using a VAPR, shaver and a alex. This included an acromioplasty and bursectomy, inspection of the rotator cuff.  Attention was then turned to the distal clavicle where a distal claviculectomy was then performed using a VAPR and a alex. The incisions were closed using a  3-0 nylon.  She was then taken through range of motion and a good manipulation of been performed.  Sterile dressing was applied followed by application of an ice pack and UltraSling. The patient was then extubated and taken to the recovery room in stable condition. No complications.        Brianna Diaz MD     Date: 2/26/2025  Time: 12:00 EST

## 2025-02-26 NOTE — ANESTHESIA POSTPROCEDURE EVALUATION
Patient: Beatriz Larios    Procedure Summary       Date: 02/26/25 Room / Location: [facility] OR  / [facility]    Anesthesia Start: 1056 Anesthesia Stop: 1209    Procedures:       SHOULDER ARTHROSCOPY WITH subacromial decompression, distal clavical excision (Right: Shoulder)      SHOULDER MANIPULATION (Right: Shoulder) Diagnosis:       Adhesive capsulitis of right shoulder      Incomplete tear of right rotator cuff, unspecified whether traumatic      Right shoulder pain, unspecified chronicity      (Adhesive capsulitis of right shoulder [M75.01])      (Incomplete tear of right rotator cuff, unspecified whether traumatic [M75.111])      (Right shoulder pain, unspecified chronicity [M25.511])    Surgeons: Brianna Diaz MD Provider: Erika Hill MD    Anesthesia Type: general, regional ASA Status: 2            Anesthesia Type: general, regional    Vitals  Vitals Value Taken Time   /104 02/26/25 1305   Temp 35.8 °C (96.5 °F) 02/26/25 1207   Pulse 62 02/26/25 1304   Resp 14 02/26/25 1207   SpO2 100 % 02/26/25 1304   Vitals shown include unfiled device data.        Post Anesthesia Care and Evaluation    Patient location during evaluation: bedside  Patient participation: complete - patient participated  Level of consciousness: awake  Pain management: adequate    Airway patency: patent  PONV Status: none  Cardiovascular status: acceptable and stable  Respiratory status: acceptable  Hydration status: acceptable

## 2025-02-26 NOTE — DISCHARGE INSTRUCTIONS
DISCHARGE INSTRUCTIONS  Post-Operative  Arthroscopic Shoulder Surgery      For your surgery you had:  General anesthesia (you may have a sore throat for the first 24 hours)  You received an anesthesia medication today that can cause hormonal forms of birth control to be ineffective. You should use a different form of birth control (to prevent pregnancy) for 7 days.   IV sedation.  Local anesthesia  Monitored anesthesia care  You received a medicated patch for nausea prevention today (behind your ear). It is recommended that you remove it 24-48 hours post-operatively. It must be removed within 72 hours.   You may experience dizziness, drowsiness, or light-headedness for several hours following surgery/procedure.  Do not stay alone today or tonight.  Limit your activity for 24 hours.  Resume your diet slowly.  Follow whatever special dietary instructions you may have been given by your doctor.  You should not drive or operate machinery or drink alcohol for 24 hours or while you are taking pain medication.  You should not sign legally binding documents.  Post-Operative Day #1 is counted as the 1st day after surgery.  [x] If you had a regional block, you will not have control of your arm for up to 12 hours.  Protect the arm with a sling or follow your physician's specific instructions.  Post-Operative Day #3 Saturday 3/1/25  Remove your dressing.  Under the dressing you will either have sutures or staples.  Change dressing once or twice daily.  Place a dry dressing or band-aids over the small incisions.  Prior to dressing change, wash your hands.  Post-Operative Day #3 Saturday 3/1/25  You may shower.  During the shower, you may let the water hit the incision and roll down but do not scrub or place any soap on the incision.  Do not soak it.  Pat it dry and do not put any ointments on the incision unless directed by surgeon. Then replace the dry dressing.    General Instructions  [x] Use ice pack to shoulder  continuously. May take breaks as needed. Gel packs stay cold for 4 hours at a time, then switch out for new ones. This can help with reducing swelling and pain relief.  Never place ice directly on skin.  Avoid getting dressing wet.  Keep arm elevated with sling to decrease swelling and minimize throbbing pain.  The sling will provide support for your shoulder.  It is important to remove the sling 3-5 times daily to work on range-of-motion of the elbow, wrist and hand.  You will receive a prescription for physical therapy, unless otherwise instructed by physician.  After most shoulder surgeries, it is permissible to start exercises by slowing trying to crawl your fingers up a wall until your arm is parallel to the floor.  While doing this support the affected arm at the elbow or closer to the shoulder.  You may also lean over and let the arm and hand do small or large circles or write the alphabet with your hanging arm to keep it loose.  It is normal to have some pain with these gentle exercises.  The exercises help reduce swelling and pain overall and help to avoid a stiff shoulder post-operatively.  It is okay to come out of the sling for showering or for certain activities of daily living but avoid any lifting or carrying with the affected arm until instructed by the physician especially if you have had a repair of the rotator cuff or some type of reconstructive procedure.  It is okay to come out of the sling and support the arm with a pillow if you remain sedentary.  In general, sling use is preferred following a repair or reconstruction for 4 weeks.  If you have had a SAD (sub acromial decompression), continue sling use more liberally because there was not a repair or reconstruction that could be harmed.          DISCHARGE INSTRUCTIONS  Post-Operative   Arthroscopic Shoulder Surgery      Exercise fingers for 10 minutes every hour while awake.  The physician will typically inform the family and the patient  whether or not a repair or reconstruction could be harmed.  If you have had a rotator cuff repair or reconstructive procedure, do not let the arm drop down suddenly from an elevated position down to your side despite improvements made in pain and over the 3 months following repair and reconstruction.  It generally takes 8-12 weeks before the repair or reconstruction does not rely on sutures and anchors that are placed for the repair.  You are generally given a prescription for a narcotic medication.  Take as prescribed.  You may use over-the-counter anti-inflammatory medications such as Motrin or Aleve for additional pain or fever reduction if not allergic.  If you are taking the pain medication prescribed, do not take Tylenol too unless you consult with the pharmacist. The pain medications generally have Tylenol in them and too much Tylenol can be harmful.  Sometimes it is recommended to take an anti-inflammatory in between doses of prescription pain medication if additional pain relief is needed.  Consult with your physician or your pharmacist before taking over-the-counter pain medications/anti-inflammatories.  It is not uncommon to have a fever post-operatively especially in the first 24-48 hours.  Anti-inflammatories can be used for fever reduction also.  It is normal to have some redness or irritation around skin sutures or staples, however, if you have any expanding areas of redness with a persistent fever and increasing pain notify the physician as soon as possible.  The incidence of blood clots is low following arthroscopic procedures, however, if you notice any increasing pain or swelling in your calves or legs, contact the physician as soon as possible.   It is difficult to sleep in the customary fashion following a shoulder surgery.  It is usually necessary to sleep with the shoulder above the level of the heart such as in a recliner, couch or with the head of the bed elevated.  This should slowly  improve over the weeks following shoulder surgery.  If unable to urinate 6 to 8 hours after surgery or urinating frequently in small amounts, notify the physician or go to the nearest Emergency Room.  SPECIAL INSTRUCTIONS:        NOTIFY YOUR PHYSICIAN IF YOU EXPERIENCE:  Numbness of fingers.  Inability to move fingers.  Extreme coldness, paleness or blue dis-coloration of fingers.  Any pain other than from the incision, such as swelling of the arm that blocks circulation of fingers.  Follow verbal instructions from your doctor.  Medications per physician instructions as indicated on Discharge Medication Information Sheet.  You should see Noemi CHEUNG for follow-up care on Thursday Mar 13, 2025 10:45 AM   Phone number: [phone]  Missing your appointment/follow-up could lead to serious complications  If you have an emergency and cannot reach your doctor, go to an Emergency Room nearest your home.

## 2025-03-13 ENCOUNTER — OFFICE VISIT (OUTPATIENT)
Dept: ORTHOPEDIC SURGERY | Facility: CLINIC | Age: 45
End: 2025-03-13
Payer: COMMERCIAL

## 2025-03-13 VITALS
WEIGHT: 153 LBS | DIASTOLIC BLOOD PRESSURE: 99 MMHG | BODY MASS INDEX: 24.59 KG/M2 | OXYGEN SATURATION: 99 % | HEIGHT: 66 IN | HEART RATE: 80 BPM | SYSTOLIC BLOOD PRESSURE: 144 MMHG

## 2025-03-13 DIAGNOSIS — Z98.890 S/P ARTHROSCOPY OF RIGHT SHOULDER: Primary | ICD-10-CM

## 2025-03-13 DIAGNOSIS — M75.01 ADHESIVE CAPSULITIS OF RIGHT SHOULDER: ICD-10-CM

## 2025-03-13 NOTE — PROGRESS NOTES
"Chief Complaint  Follow-up of the Right Shoulder    Subjective      Beatriz Larios presents to [facility] for follow up of their right shoulder.  Patient is 2-week status post right shoulder arthroscopy with extensive subacromial decompression and distal clavicle resection as well as manipulation.  She returns to clinic today stating that she is sore.  She states physical therapy is very painful and uncomfortable.  She has attended physical therapy daily to allow for stretching which really hurts.  She says she is only been taking ibuprofen twice daily and is weaned off the heating and icing.  She states that she does not return back to physical therapy until Monday.  She states that she will then go 3 times a week.  She states that she has a pulley system at home and she has been doing her home exercises.  She states the pain after therapy is pretty significant.    Allergies   Allergen Reactions    Penicillins Hives     States can take keflex       Objective     Vital Signs:   Vitals:    03/13/25 1049   BP: 144/99   Pulse: 80   SpO2: 99%   Weight: 69.4 kg (153 lb)   Height: 167.6 cm (65.98\")     Body mass index is 24.71 kg/m².    I reviewed the patient's chief complaint, history of present illness, review of systems, past medical history, surgical history, family history, social history, medications, and allergy list.     Ortho Exam  Shoulder Scope   General: Alert, no acute distress  Right upper extremity: Sutures removed today without complications. Arthroscopy portals are well healing without active drainage or redness noted. No concerning signs of infection.  Under 55 degrees active forward shoulder elevation.  100 degrees active shoulder abduction.  External rotation 55 degrees.  Internal rotation 45 degrees.  Demonstrates intact active elbow flexion and extension. Demonstrates intact active wrist and finger range of motion. Thumb opposition intact. Palmar abduction of the thumb " intact. Sensation intact to the axillary, median, radial, and ulnar nerve distributions. Palpable radial pulse.           Imaging Results (Most Recent)       None                Assessment and Plan   Diagnoses and all orders for this visit:    1. S/P arthroscopy of right shoulder with SAD and DCR (Primary)    2. Adhesive capsulitis of right shoulder         .Beatriz Larios presents to clinic today for 2 week post op right shoulder arthroscopy with subacromial decompression and distal clavicle resection and manipulation, performed by Dr Diaz on 2/26/2025. Sutures/staples removed today, steri-strips applied. Incisions are well-healing.  No active drainage or redness noted.  No concerning signs of infection.  Patient is doing well and denies fever or chills.  Incision site care was reviewed today. Patient instructed not to soak or submerge incision. Do not apply any lotions, creams, or ointments to the incision at this time.  We discussed concerning signs and symptoms regarding the incision site.    She was advised to continue her home exercises and physical therapy.  She was advised to continue heat/ice for pain/swelling.  She was advised to continue ibuprofen intermittently.    Follow up in 4 weeks.     Call the office if you have any questions or concerns.          Tobacco Use: Low Risk  (3/13/2025)    Patient History     Smoking Tobacco Use: Never     Smokeless Tobacco Use: Never     Passive Exposure: Not on file     Patient reports that they are a nonsmoker; cessation education not applicable.     BMI is within normal parameters. No other follow-up for BMI required.      Follow Up   Return in about 1 month (around 4/13/2025).  There are no Patient Instructions on file for this visit.    Patient was given instructions and counseling regarding her condition or for health maintenance advice. Please see specific information pulled into the AVS if appropriate.     Dictated Utilizing Dragon Dictation. Please note that  portions of this note were completed with a voice recognition program. Part of this note may be an electronic transcription/translation of spoken language to printed text using the Dragon Dictation System.

## 2025-04-15 ENCOUNTER — OFFICE VISIT (OUTPATIENT)
Dept: ORTHOPEDIC SURGERY | Facility: CLINIC | Age: 45
End: 2025-04-15
Payer: OTHER MISCELLANEOUS

## 2025-04-15 VITALS — OXYGEN SATURATION: 98 % | DIASTOLIC BLOOD PRESSURE: 88 MMHG | SYSTOLIC BLOOD PRESSURE: 136 MMHG | HEART RATE: 81 BPM

## 2025-04-15 DIAGNOSIS — M75.01 ADHESIVE CAPSULITIS OF RIGHT SHOULDER: ICD-10-CM

## 2025-04-15 DIAGNOSIS — Z98.890 S/P ARTHROSCOPY OF RIGHT SHOULDER: Primary | ICD-10-CM

## 2025-04-15 DIAGNOSIS — M25.511 RIGHT SHOULDER PAIN, UNSPECIFIED CHRONICITY: ICD-10-CM

## 2025-04-15 RX ORDER — MELOXICAM 15 MG/1
15 TABLET ORAL DAILY
COMMUNITY
Start: 2025-02-26

## 2025-04-15 RX ORDER — METOPROLOL SUCCINATE 25 MG/1
25 TABLET, EXTENDED RELEASE ORAL DAILY
COMMUNITY
Start: 2025-04-02

## 2025-04-17 NOTE — PROGRESS NOTES
Chief Complaint  Follow-up of the Right Shoulder    Subjective      Beatriz Larios presents to [facility]     History of Present Illness  The patient is here today following up on her right shoulder.    Significant improvement in the right shoulder condition is reported, attributed to participation in physical therapy sessions twice weekly. She expresses readiness to discontinue these sessions, believing she can manage independently. The ability to elevate the arm above the waist and behind the back has been regained. Lying on the shoulder at night is possible without discomfort. Additional physical therapy is not perceived as necessary, as all recommended exercises can be performed at home. The therapist has advised focusing on quarter stretches and side-lying exercises. Involvement in a worker's compensation case related to the shoulder injury is ongoing. Ibuprofen is occasionally used for pain management, and the prescribed anti-inflammatory medication is taken once daily.    SOCIAL HISTORY  Exercise: Physical therapy 2 days a week        Allergies   Allergen Reactions    Penicillins Hives     States can take keflex       Objective     Vital Signs:   Vitals:    04/15/25 1508   BP: 136/88   Pulse: 81   SpO2: 98%   PainSc: 0-No pain     There is no height or weight on file to calculate BMI.    I reviewed the patient's chief complaint, history of present illness, review of systems, past medical history, surgical history, family history, social history, medications, and allergy list.     Ortho Exam  Shoulder   General: Alert. No acute distress.  Right Upper Extremity: No skin discoloration, atrophy, or swelling. 180 degrees active elevation. External rotation to 45 degrees. Internal rotation to side pocket.   Demonstrates intact active elbow ROM. Demonstrates intact active wrist ROM. Sensation intact. Palpable radial pulse. Neurovascularly intact.      Physical  Exam  Musculoskeletal:  Right Shoulder:  - Improved range of motion noted.  - Less stiffness observed.  - Able to lift arm up and out to the side with improvement.  - Difficulty noted with reaching behind the back.            Imaging Results (Most Recent)       None             Results           Assessment and Plan   Diagnoses and all orders for this visit:    1. S/P arthroscopy of right shoulder with SAD and DCR (Primary)    2. Adhesive capsulitis of right shoulder    3. Right shoulder pain, unspecified chronicity         Assessment & Plan  1. Right shoulder adhesive capsulitis:  Significant improvement noted, decreased stiffness, able to perform more movements, occasional ibuprofen use, and continued anti-inflammatory medication. Maintain a regimen of stretching exercises twice daily to prevent recurrence of adhesions, especially within the next 3 months. Gradually reduce anti-inflammatory medication intake by taking it every other day. Continue monitoring kidney function with biannual labs while on anti-inflammatories. No restrictions on activities; can use heat, manipulation, therapy, and massage as needed. If discomfort increases, contact the office for potential physical therapy or steroid injection. A work note will be provided indicating fit for full duty without restrictions.    Follow-up: As needed for shoulder.            Tobacco Use: Low Risk  (4/15/2025)    Patient History     Smoking Tobacco Use: Never     Smokeless Tobacco Use: Never     Passive Exposure: Not on file     Patient reports that they are a nonsmoker; cessation education not applicable.     BMI is within normal parameters. No other follow-up for BMI required.      Follow Up   No follow-ups on file.  There are no Patient Instructions on file for this visit.    Patient was given instructions and counseling regarding her condition or for health maintenance advice. Please see specific information pulled into the AVS if appropriate.     Patient  or patient representative verbalized consent for the use of Ambient Listening during the visit with  SKYE Castellanos for chart documentation. 4/16/2025  20:49 EDT    Dictated Utilizing Dragon Dictation. Please note that portions of this note were completed with a voice recognition program. Part of this note may be an electronic transcription/translation of spoken language to printed text using the Dragon Dictation System.

## 2025-05-12 ENCOUNTER — PATIENT ROUNDING (BHMG ONLY) (OUTPATIENT)
Dept: URGENT CARE | Facility: CLINIC | Age: 45
End: 2025-05-12
Payer: COMMERCIAL

## 2025-05-12 NOTE — ED NOTES
Thank you for letting us care for you in your recent visit to our urgent care center. We would love to hear about your experience with us. Was this the first time you have visited our location?    We're always looking for ways to make our patients' experiences even better. Do you have any recommendations on ways we may improve?     I appreciate you taking the time to respond. Please be on the lookout for a survey about your recent visit from [employer] via text or email. We would greatly appreciate if you could fill that out and turn it back in. We want your voice to be heard and we value your feedback.   Thank you for choosing [facility] for your healthcare needs.

## (undated) DEVICE — GLOVE,SURG,SENSICARE SLT,LF,PF,8.5: Brand: MEDLINE

## (undated) DEVICE — THE STERILE LIGHT HANDLE COVER IS USED WITH STERIS SURGICAL LIGHTING AND VISUALIZATION SYSTEMS.

## (undated) DEVICE — GLV SURG BIOGEL LTX PF 8 1/2

## (undated) DEVICE — STERILE POLYISOPRENE POWDER-FREE SURGICAL GLOVES WITH EMOLLIENT COATING: Brand: PROTEXIS

## (undated) DEVICE — PENCL SMOKE/EVAC MEGADYNE TELESCP 10FT

## (undated) DEVICE — SUT VIC 2/0 CT1 36IN

## (undated) DEVICE — SHOULDER P.A.D. III: Brand: DEROYAL

## (undated) DEVICE — INTENDED FOR TISSUE SEPARATION, AND OTHER PROCEDURES THAT REQUIRE A SHARP SURGICAL BLADE TO PUNCTURE OR CUT.: Brand: BARD-PARKER ® CARBON RIB-BACK BLADES

## (undated) DEVICE — DRSNG WND GZ CURAD OIL EMULSION 3X3IN STRL

## (undated) DEVICE — BUR BRL FORMLA 12FLUT 4MM

## (undated) DEVICE — ANTIBACTERIAL VIOLET BRAIDED (POLYGLACTIN 910), SYNTHETIC ABSORBABLE SURGICAL SUTURE: Brand: COATED VICRYL

## (undated) DEVICE — SUT MNCRYL PLS ANTIB UD 3/0 PS2 27IN

## (undated) DEVICE — SOL IRR NACL 0.9PCT 3000ML

## (undated) DEVICE — APPL CHLORAPREP HI/LITE 26ML ORNG

## (undated) DEVICE — TBG INFLOW/OUTFLOW DUALWAVE 1P/U

## (undated) DEVICE — GAUZE,SPONGE,4"X4",16PLY,STRL,LF,10/TRAY: Brand: MEDLINE

## (undated) DEVICE — POSTN HD UNIV

## (undated) DEVICE — OSC BEACH CHAIR SHOULDER-LF: Brand: MEDLINE INDUSTRIES, INC.

## (undated) DEVICE — SUT ETHLN 3-0 FS118IN 663H

## (undated) DEVICE — BLD CUT FORMLA AGGR PLS 4.0MM

## (undated) DEVICE — GLOVE,SURG,SENSICARE SLT,LF,PF,7: Brand: MEDLINE

## (undated) DEVICE — SLV SCD KN/LEN ADJ EXPRSS BLENDED MD 1P/U

## (undated) DEVICE — PAD GRND REM POLYHESIVE A/ DISP

## (undated) DEVICE — 90-S CRUISE, SUCTION PROBE, NON-BENDABLE, MAX CUT LEVEL 1: Brand: SERFAS ENERGY